# Patient Record
Sex: MALE | Race: BLACK OR AFRICAN AMERICAN | Employment: FULL TIME | URBAN - METROPOLITAN AREA
[De-identification: names, ages, dates, MRNs, and addresses within clinical notes are randomized per-mention and may not be internally consistent; named-entity substitution may affect disease eponyms.]

---

## 2020-01-16 NOTE — ROUTINE PROCESS
Javi Christensen has decided with their surgeon to have a joint replacement to improve mobility and decrease pain. Joint Replacement Pre-Operative class was attended 1/16/2020. Topics discussed included surgery preparation, what to expect the day of surgery, medications (to include a multimodal approach to pain control and limiting narcotics), nutrition, glycemic control, respiratory therapy, physical and occupational therapy, and discharge planning. Discussed the importance of using these alternative pain management methods with the goal of using less opioid use after surgery and at home. A patient education notebook was provided and the opportunity was given to ask questions. The phone number of the Orthopaedic  was provided for any future questions or concerns.

## 2020-01-17 ENCOUNTER — HOSPITAL ENCOUNTER (OUTPATIENT)
Dept: PREADMISSION TESTING | Age: 55
Discharge: HOME | End: 2020-01-17
Attending: ORTHOPAEDIC SURGERY
Payer: COMMERCIAL

## 2020-01-17 LAB
ABO + RH BLD: NORMAL
APPEARANCE UR: CLEAR
APTT PPP: 33.2 SEC (ref 23–36.4)
BILIRUB UR QL: NEGATIVE
BLOOD GROUP ANTIBODIES SERPL: NORMAL
COLOR UR: YELLOW
GLUCOSE UR STRIP.AUTO-MCNC: NEGATIVE MG/DL
HGB UR QL STRIP: NEGATIVE
INR PPP: 1 (ref 0.8–1.2)
KETONES UR QL STRIP.AUTO: NEGATIVE MG/DL
LEUKOCYTE ESTERASE UR QL STRIP.AUTO: NEGATIVE
NITRITE UR QL STRIP.AUTO: NEGATIVE
PH UR STRIP: 6.5 [PH] (ref 5–8)
PROT UR STRIP-MCNC: NEGATIVE MG/DL
PROTHROMBIN TIME: 12.9 SEC (ref 11.5–15.2)
SP GR UR REFRACTOMETRY: 1.02 (ref 1–1.03)
SPECIMEN EXP DATE BLD: NORMAL
UROBILINOGEN UR QL STRIP.AUTO: 0.2 EU/DL (ref 0.2–1)

## 2020-01-17 PROCEDURE — 81003 URINALYSIS AUTO W/O SCOPE: CPT

## 2020-01-17 PROCEDURE — 85730 THROMBOPLASTIN TIME PARTIAL: CPT

## 2020-01-17 PROCEDURE — 36415 COLL VENOUS BLD VENIPUNCTURE: CPT

## 2020-01-17 PROCEDURE — 86900 BLOOD TYPING SEROLOGIC ABO: CPT

## 2020-01-17 PROCEDURE — 87086 URINE CULTURE/COLONY COUNT: CPT

## 2020-01-17 PROCEDURE — 85610 PROTHROMBIN TIME: CPT

## 2020-01-19 LAB
BACTERIA SPEC CULT: NORMAL
SERVICE CMNT-IMP: NORMAL
SERVICE CMNT-IMP: NORMAL

## 2020-01-28 ENCOUNTER — ANESTHESIA EVENT (OUTPATIENT)
Dept: SURGERY | Age: 55
DRG: 470 | End: 2020-01-28
Payer: COMMERCIAL

## 2020-01-28 RX ORDER — TRANEXAMIC ACID 10 MG/ML
1 INJECTION, SOLUTION INTRAVENOUS
Status: CANCELLED | OUTPATIENT
Start: 2020-01-29 | End: 2020-01-29

## 2020-01-29 ENCOUNTER — ANESTHESIA (OUTPATIENT)
Dept: SURGERY | Age: 55
DRG: 470 | End: 2020-01-29
Payer: COMMERCIAL

## 2020-01-29 ENCOUNTER — HOSPITAL ENCOUNTER (INPATIENT)
Age: 55
LOS: 2 days | Discharge: HOME HEALTH | DRG: 470 | End: 2020-01-31
Attending: ORTHOPAEDIC SURGERY | Admitting: ORTHOPAEDIC SURGERY
Payer: COMMERCIAL

## 2020-01-29 ENCOUNTER — APPOINTMENT (OUTPATIENT)
Dept: GENERAL RADIOLOGY | Age: 55
DRG: 470 | End: 2020-01-29
Attending: ORTHOPAEDIC SURGERY
Payer: COMMERCIAL

## 2020-01-29 DIAGNOSIS — Z96.652 TOTAL KNEE REPLACEMENT STATUS, LEFT: Primary | ICD-10-CM

## 2020-01-29 LAB
GLUCOSE BLD STRIP.AUTO-MCNC: 104 MG/DL (ref 70–110)
GLUCOSE BLD STRIP.AUTO-MCNC: 193 MG/DL (ref 70–110)
GLUCOSE BLD STRIP.AUTO-MCNC: 241 MG/DL (ref 70–110)
GLUCOSE BLD STRIP.AUTO-MCNC: 264 MG/DL (ref 70–110)
GLUCOSE BLD STRIP.AUTO-MCNC: 93 MG/DL (ref 70–110)

## 2020-01-29 PROCEDURE — 77030020782 HC GWN BAIR PAWS FLX 3M -B: Performed by: ORTHOPAEDIC SURGERY

## 2020-01-29 PROCEDURE — 77030040815: Performed by: ORTHOPAEDIC SURGERY

## 2020-01-29 PROCEDURE — 74011250636 HC RX REV CODE- 250/636: Performed by: ANESTHESIOLOGY

## 2020-01-29 PROCEDURE — 77030018842 HC SOL IRR SOD CL 9% BAXT -A: Performed by: ORTHOPAEDIC SURGERY

## 2020-01-29 PROCEDURE — 77030002933 HC SUT MCRYL J&J -A: Performed by: ORTHOPAEDIC SURGERY

## 2020-01-29 PROCEDURE — 74011000250 HC RX REV CODE- 250: Performed by: ANESTHESIOLOGY

## 2020-01-29 PROCEDURE — 77030011628: Performed by: ORTHOPAEDIC SURGERY

## 2020-01-29 PROCEDURE — 77010033678 HC OXYGEN DAILY

## 2020-01-29 PROCEDURE — 82962 GLUCOSE BLOOD TEST: CPT

## 2020-01-29 PROCEDURE — 76942 ECHO GUIDE FOR BIOPSY: CPT | Performed by: ORTHOPAEDIC SURGERY

## 2020-01-29 PROCEDURE — 77030031139 HC SUT VCRL2 J&J -A: Performed by: ORTHOPAEDIC SURGERY

## 2020-01-29 PROCEDURE — 97161 PT EVAL LOW COMPLEX 20 MIN: CPT

## 2020-01-29 PROCEDURE — 77030000032 HC CUF TRNQT ZIMM -B: Performed by: ORTHOPAEDIC SURGERY

## 2020-01-29 PROCEDURE — 74011000258 HC RX REV CODE- 258: Performed by: ORTHOPAEDIC SURGERY

## 2020-01-29 PROCEDURE — C9290 INJ, BUPIVACAINE LIPOSOME: HCPCS | Performed by: ORTHOPAEDIC SURGERY

## 2020-01-29 PROCEDURE — 74011000258 HC RX REV CODE- 258: Performed by: NURSE ANESTHETIST, CERTIFIED REGISTERED

## 2020-01-29 PROCEDURE — C1776 JOINT DEVICE (IMPLANTABLE): HCPCS | Performed by: ORTHOPAEDIC SURGERY

## 2020-01-29 PROCEDURE — 77030016060 HC NDL NRV BLK TELE -A: Performed by: ANESTHESIOLOGY

## 2020-01-29 PROCEDURE — 77030033067 HC SUT PDO STRATFX SPIR J&J -B: Performed by: ORTHOPAEDIC SURGERY

## 2020-01-29 PROCEDURE — 74011250636 HC RX REV CODE- 250/636: Performed by: ORTHOPAEDIC SURGERY

## 2020-01-29 PROCEDURE — 64447 NJX AA&/STRD FEMORAL NRV IMG: CPT | Performed by: ANESTHESIOLOGY

## 2020-01-29 PROCEDURE — 73560 X-RAY EXAM OF KNEE 1 OR 2: CPT

## 2020-01-29 PROCEDURE — 77030013708 HC HNDPC SUC IRR PULS STRY –B: Performed by: ORTHOPAEDIC SURGERY

## 2020-01-29 PROCEDURE — 0SRD0J9 REPLACEMENT OF LEFT KNEE JOINT WITH SYNTHETIC SUBSTITUTE, CEMENTED, OPEN APPROACH: ICD-10-PCS | Performed by: ORTHOPAEDIC SURGERY

## 2020-01-29 PROCEDURE — 77030038010: Performed by: ORTHOPAEDIC SURGERY

## 2020-01-29 PROCEDURE — 77030027138 HC INCENT SPIROMETER -A: Performed by: ORTHOPAEDIC SURGERY

## 2020-01-29 PROCEDURE — 74011000250 HC RX REV CODE- 250: Performed by: NURSE ANESTHETIST, CERTIFIED REGISTERED

## 2020-01-29 PROCEDURE — 77030012551: Performed by: ORTHOPAEDIC SURGERY

## 2020-01-29 PROCEDURE — 76210000016 HC OR PH I REC 1 TO 1.5 HR: Performed by: ORTHOPAEDIC SURGERY

## 2020-01-29 PROCEDURE — 74011636637 HC RX REV CODE- 636/637: Performed by: ORTHOPAEDIC SURGERY

## 2020-01-29 PROCEDURE — 97116 GAIT TRAINING THERAPY: CPT

## 2020-01-29 PROCEDURE — 77030040361 HC SLV COMPR DVT MDII -B: Performed by: ORTHOPAEDIC SURGERY

## 2020-01-29 PROCEDURE — 74011000250 HC RX REV CODE- 250: Performed by: ORTHOPAEDIC SURGERY

## 2020-01-29 PROCEDURE — 74011250637 HC RX REV CODE- 250/637: Performed by: ANESTHESIOLOGY

## 2020-01-29 PROCEDURE — C1713 ANCHOR/SCREW BN/BN,TIS/BN: HCPCS | Performed by: ORTHOPAEDIC SURGERY

## 2020-01-29 PROCEDURE — 77030034479 HC ADH SKN CLSR PRINEO J&J -B: Performed by: ORTHOPAEDIC SURGERY

## 2020-01-29 PROCEDURE — 74011250637 HC RX REV CODE- 250/637: Performed by: ORTHOPAEDIC SURGERY

## 2020-01-29 PROCEDURE — 76010000131 HC OR TIME 2 TO 2.5 HR: Performed by: ORTHOPAEDIC SURGERY

## 2020-01-29 PROCEDURE — 74011250636 HC RX REV CODE- 250/636: Performed by: NURSE ANESTHETIST, CERTIFIED REGISTERED

## 2020-01-29 PROCEDURE — 65270000029 HC RM PRIVATE

## 2020-01-29 PROCEDURE — 76060000035 HC ANESTHESIA 2 TO 2.5 HR: Performed by: ORTHOPAEDIC SURGERY

## 2020-01-29 PROCEDURE — 77030037875 HC DRSG MEPILEX <16IN BORD MOLN -A: Performed by: ORTHOPAEDIC SURGERY

## 2020-01-29 DEVICE — COMPONENT FEM PAT PFJ L W OXINIUM AND RND: Type: IMPLANTABLE DEVICE | Site: KNEE | Status: FUNCTIONAL

## 2020-01-29 DEVICE — CEMENT BNE 40GM FULL DOSE PMMA W/ GENT HI VISC RADPQ LNG: Type: IMPLANTABLE DEVICE | Site: KNEE | Status: FUNCTIONAL

## 2020-01-29 DEVICE — CEMENT BNE 40GM FULL DOSE PMMA W/O ANTIBIO HI VISC N RADPQ: Type: IMPLANTABLE DEVICE | Site: KNEE | Status: FUNCTIONAL

## 2020-01-29 DEVICE — JOURNEY II CR INSERT XLPE LEFT SIZE 3-4 9MM
Type: IMPLANTABLE DEVICE | Site: KNEE | Status: FUNCTIONAL
Brand: JOURNEY

## 2020-01-29 DEVICE — JOURNEY II CR FEMORAL OXINIUM NONPOROUS LEFT SIZE 4
Type: IMPLANTABLE DEVICE | Site: KNEE | Status: FUNCTIONAL
Brand: JOURNEY

## 2020-01-29 DEVICE — JOURNEY TIBIAL BASEPLATE NONPOROUS                                    LEFT SIZE 4
Type: IMPLANTABLE DEVICE | Site: KNEE | Status: FUNCTIONAL
Brand: JOURNEY

## 2020-01-29 RX ORDER — DIPHENHYDRAMINE HCL 25 MG
25 CAPSULE ORAL
Status: DISCONTINUED | OUTPATIENT
Start: 2020-01-29 | End: 2020-01-31 | Stop reason: HOSPADM

## 2020-01-29 RX ORDER — HYDROMORPHONE HYDROCHLORIDE 1 MG/ML
1 INJECTION, SOLUTION INTRAMUSCULAR; INTRAVENOUS; SUBCUTANEOUS
Status: DISCONTINUED | OUTPATIENT
Start: 2020-01-29 | End: 2020-01-31 | Stop reason: HOSPADM

## 2020-01-29 RX ORDER — ACETAMINOPHEN 500 MG
1000 TABLET ORAL EVERY 8 HOURS
Status: DISCONTINUED | OUTPATIENT
Start: 2020-01-29 | End: 2020-01-31 | Stop reason: HOSPADM

## 2020-01-29 RX ORDER — PROPOFOL 10 MG/ML
INJECTION, EMULSION INTRAVENOUS AS NEEDED
Status: DISCONTINUED | OUTPATIENT
Start: 2020-01-29 | End: 2020-01-29 | Stop reason: HOSPADM

## 2020-01-29 RX ORDER — INSULIN LISPRO 100 [IU]/ML
INJECTION, SOLUTION INTRAVENOUS; SUBCUTANEOUS
Status: DISCONTINUED | OUTPATIENT
Start: 2020-01-29 | End: 2020-01-31 | Stop reason: HOSPADM

## 2020-01-29 RX ORDER — ROSUVASTATIN CALCIUM 10 MG/1
40 TABLET, COATED ORAL
Status: DISCONTINUED | OUTPATIENT
Start: 2020-01-29 | End: 2020-01-31 | Stop reason: HOSPADM

## 2020-01-29 RX ORDER — INSULIN LISPRO 100 [IU]/ML
INJECTION, SOLUTION INTRAVENOUS; SUBCUTANEOUS ONCE
Status: ACTIVE | OUTPATIENT
Start: 2020-01-29 | End: 2020-01-29

## 2020-01-29 RX ORDER — PREGABALIN 50 MG/1
50 CAPSULE ORAL
Status: COMPLETED | OUTPATIENT
Start: 2020-01-29 | End: 2020-01-29

## 2020-01-29 RX ORDER — SODIUM CHLORIDE, SODIUM LACTATE, POTASSIUM CHLORIDE, CALCIUM CHLORIDE 600; 310; 30; 20 MG/100ML; MG/100ML; MG/100ML; MG/100ML
125 INJECTION, SOLUTION INTRAVENOUS CONTINUOUS
Status: DISCONTINUED | OUTPATIENT
Start: 2020-01-29 | End: 2020-01-31 | Stop reason: HOSPADM

## 2020-01-29 RX ORDER — BUPIVACAINE HYDROCHLORIDE 2.5 MG/ML
INJECTION, SOLUTION EPIDURAL; INFILTRATION; INTRACAUDAL AS NEEDED
Status: DISCONTINUED | OUTPATIENT
Start: 2020-01-29 | End: 2020-01-29 | Stop reason: HOSPADM

## 2020-01-29 RX ORDER — SODIUM CHLORIDE 0.9 % (FLUSH) 0.9 %
5-40 SYRINGE (ML) INJECTION AS NEEDED
Status: DISCONTINUED | OUTPATIENT
Start: 2020-01-29 | End: 2020-01-31 | Stop reason: HOSPADM

## 2020-01-29 RX ORDER — LORATADINE 10 MG/1
10 TABLET ORAL DAILY
Status: DISCONTINUED | OUTPATIENT
Start: 2020-01-30 | End: 2020-01-31 | Stop reason: HOSPADM

## 2020-01-29 RX ORDER — ONDANSETRON 2 MG/ML
4 INJECTION INTRAMUSCULAR; INTRAVENOUS
Status: DISCONTINUED | OUTPATIENT
Start: 2020-01-29 | End: 2020-01-31 | Stop reason: HOSPADM

## 2020-01-29 RX ORDER — INSULIN LISPRO 100 [IU]/ML
INJECTION, SOLUTION INTRAVENOUS; SUBCUTANEOUS ONCE
Status: DISCONTINUED | OUTPATIENT
Start: 2020-01-29 | End: 2020-01-29 | Stop reason: HOSPADM

## 2020-01-29 RX ORDER — CEFAZOLIN SODIUM 2 G/50ML
2 SOLUTION INTRAVENOUS EVERY 8 HOURS
Status: COMPLETED | OUTPATIENT
Start: 2020-01-29 | End: 2020-01-31

## 2020-01-29 RX ORDER — SODIUM CHLORIDE, SODIUM LACTATE, POTASSIUM CHLORIDE, CALCIUM CHLORIDE 600; 310; 30; 20 MG/100ML; MG/100ML; MG/100ML; MG/100ML
125 INJECTION, SOLUTION INTRAVENOUS CONTINUOUS
Status: DISCONTINUED | OUTPATIENT
Start: 2020-01-29 | End: 2020-01-29 | Stop reason: HOSPADM

## 2020-01-29 RX ORDER — KETOROLAC TROMETHAMINE 30 MG/ML
15 INJECTION, SOLUTION INTRAMUSCULAR; INTRAVENOUS EVERY 6 HOURS
Status: COMPLETED | OUTPATIENT
Start: 2020-01-29 | End: 2020-01-30

## 2020-01-29 RX ORDER — OXYCODONE HYDROCHLORIDE 5 MG/1
10 TABLET ORAL
Status: DISCONTINUED | OUTPATIENT
Start: 2020-01-29 | End: 2020-01-31 | Stop reason: HOSPADM

## 2020-01-29 RX ORDER — SODIUM CHLORIDE 9 MG/ML
125 INJECTION, SOLUTION INTRAVENOUS CONTINUOUS
Status: DISPENSED | OUTPATIENT
Start: 2020-01-29 | End: 2020-01-30

## 2020-01-29 RX ORDER — DEXAMETHASONE SODIUM PHOSPHATE 4 MG/ML
INJECTION, SOLUTION INTRA-ARTICULAR; INTRALESIONAL; INTRAMUSCULAR; INTRAVENOUS; SOFT TISSUE AS NEEDED
Status: DISCONTINUED | OUTPATIENT
Start: 2020-01-29 | End: 2020-01-29 | Stop reason: HOSPADM

## 2020-01-29 RX ORDER — SODIUM CHLORIDE 0.9 % (FLUSH) 0.9 %
5-40 SYRINGE (ML) INJECTION EVERY 8 HOURS
Status: DISCONTINUED | OUTPATIENT
Start: 2020-01-29 | End: 2020-01-31 | Stop reason: HOSPADM

## 2020-01-29 RX ORDER — ASPIRIN 81 MG/1
81 TABLET ORAL 2 TIMES DAILY
Status: DISCONTINUED | OUTPATIENT
Start: 2020-01-29 | End: 2020-01-31 | Stop reason: HOSPADM

## 2020-01-29 RX ORDER — FENTANYL CITRATE 50 UG/ML
25 INJECTION, SOLUTION INTRAMUSCULAR; INTRAVENOUS AS NEEDED
Status: DISCONTINUED | OUTPATIENT
Start: 2020-01-29 | End: 2020-01-29 | Stop reason: HOSPADM

## 2020-01-29 RX ORDER — SODIUM CHLORIDE 0.9 % (FLUSH) 0.9 %
5-40 SYRINGE (ML) INJECTION EVERY 8 HOURS
Status: DISCONTINUED | OUTPATIENT
Start: 2020-01-29 | End: 2020-01-29 | Stop reason: HOSPADM

## 2020-01-29 RX ORDER — OLMESARTAN MEDOXOMIL 20 MG/1
20 TABLET ORAL DAILY
Status: DISCONTINUED | OUTPATIENT
Start: 2020-01-30 | End: 2020-01-31 | Stop reason: HOSPADM

## 2020-01-29 RX ORDER — MIDAZOLAM HYDROCHLORIDE 1 MG/ML
INJECTION, SOLUTION INTRAMUSCULAR; INTRAVENOUS AS NEEDED
Status: DISCONTINUED | OUTPATIENT
Start: 2020-01-29 | End: 2020-01-29 | Stop reason: HOSPADM

## 2020-01-29 RX ORDER — HYDROMORPHONE HYDROCHLORIDE 2 MG/ML
0.5 INJECTION, SOLUTION INTRAMUSCULAR; INTRAVENOUS; SUBCUTANEOUS
Status: DISCONTINUED | OUTPATIENT
Start: 2020-01-29 | End: 2020-01-29 | Stop reason: HOSPADM

## 2020-01-29 RX ORDER — DEXTROSE MONOHYDRATE 100 MG/ML
125-250 INJECTION, SOLUTION INTRAVENOUS AS NEEDED
Status: DISCONTINUED | OUTPATIENT
Start: 2020-01-29 | End: 2020-01-31 | Stop reason: HOSPADM

## 2020-01-29 RX ORDER — ACETAMINOPHEN 500 MG
1000 TABLET ORAL
Status: COMPLETED | OUTPATIENT
Start: 2020-01-29 | End: 2020-01-29

## 2020-01-29 RX ORDER — LIDOCAINE HYDROCHLORIDE 20 MG/ML
INJECTION, SOLUTION EPIDURAL; INFILTRATION; INTRACAUDAL; PERINEURAL AS NEEDED
Status: DISCONTINUED | OUTPATIENT
Start: 2020-01-29 | End: 2020-01-29 | Stop reason: HOSPADM

## 2020-01-29 RX ORDER — HYDROCHLOROTHIAZIDE 12.5 MG/1
12.5 CAPSULE ORAL DAILY
Status: DISCONTINUED | OUTPATIENT
Start: 2020-01-30 | End: 2020-01-31 | Stop reason: HOSPADM

## 2020-01-29 RX ORDER — LANOLIN ALCOHOL/MO/W.PET/CERES
1 CREAM (GRAM) TOPICAL
Status: DISCONTINUED | OUTPATIENT
Start: 2020-01-30 | End: 2020-01-31 | Stop reason: HOSPADM

## 2020-01-29 RX ORDER — CEFAZOLIN SODIUM 2 G/50ML
2 SOLUTION INTRAVENOUS ONCE
Status: COMPLETED | OUTPATIENT
Start: 2020-01-29 | End: 2020-01-29

## 2020-01-29 RX ORDER — ONDANSETRON 2 MG/ML
INJECTION INTRAMUSCULAR; INTRAVENOUS AS NEEDED
Status: DISCONTINUED | OUTPATIENT
Start: 2020-01-29 | End: 2020-01-29 | Stop reason: HOSPADM

## 2020-01-29 RX ORDER — MAGNESIUM SULFATE 100 %
4 CRYSTALS MISCELLANEOUS AS NEEDED
Status: DISCONTINUED | OUTPATIENT
Start: 2020-01-29 | End: 2020-01-31 | Stop reason: HOSPADM

## 2020-01-29 RX ORDER — OXYCODONE HYDROCHLORIDE 5 MG/1
5 TABLET ORAL
Status: DISCONTINUED | OUTPATIENT
Start: 2020-01-29 | End: 2020-01-31 | Stop reason: HOSPADM

## 2020-01-29 RX ORDER — NALOXONE HYDROCHLORIDE 0.4 MG/ML
0.4 INJECTION, SOLUTION INTRAMUSCULAR; INTRAVENOUS; SUBCUTANEOUS AS NEEDED
Status: DISCONTINUED | OUTPATIENT
Start: 2020-01-29 | End: 2020-01-31 | Stop reason: HOSPADM

## 2020-01-29 RX ORDER — LORAZEPAM 0.5 MG/1
0.5 TABLET ORAL
Status: DISCONTINUED | OUTPATIENT
Start: 2020-01-29 | End: 2020-01-31 | Stop reason: HOSPADM

## 2020-01-29 RX ORDER — CELECOXIB 100 MG/1
200 CAPSULE ORAL
Status: COMPLETED | OUTPATIENT
Start: 2020-01-29 | End: 2020-01-29

## 2020-01-29 RX ORDER — MAGNESIUM SULFATE 100 %
4 CRYSTALS MISCELLANEOUS AS NEEDED
Status: DISCONTINUED | OUTPATIENT
Start: 2020-01-29 | End: 2020-01-29 | Stop reason: HOSPADM

## 2020-01-29 RX ORDER — HYDROMORPHONE HYDROCHLORIDE 2 MG/ML
INJECTION, SOLUTION INTRAMUSCULAR; INTRAVENOUS; SUBCUTANEOUS AS NEEDED
Status: DISCONTINUED | OUTPATIENT
Start: 2020-01-29 | End: 2020-01-29 | Stop reason: HOSPADM

## 2020-01-29 RX ORDER — KETAMINE HYDROCHLORIDE 10 MG/ML
INJECTION, SOLUTION INTRAMUSCULAR; INTRAVENOUS AS NEEDED
Status: DISCONTINUED | OUTPATIENT
Start: 2020-01-29 | End: 2020-01-29 | Stop reason: HOSPADM

## 2020-01-29 RX ORDER — SODIUM CHLORIDE 0.9 % (FLUSH) 0.9 %
5-40 SYRINGE (ML) INJECTION AS NEEDED
Status: DISCONTINUED | OUTPATIENT
Start: 2020-01-29 | End: 2020-01-29 | Stop reason: HOSPADM

## 2020-01-29 RX ORDER — GLYBURIDE 5 MG/1
5 TABLET ORAL
Status: DISCONTINUED | OUTPATIENT
Start: 2020-01-30 | End: 2020-01-30 | Stop reason: ALTCHOICE

## 2020-01-29 RX ORDER — EZETIMIBE 10 MG/1
10 TABLET ORAL
Status: DISCONTINUED | OUTPATIENT
Start: 2020-01-29 | End: 2020-01-31 | Stop reason: HOSPADM

## 2020-01-29 RX ORDER — AMOXICILLIN 250 MG
1 CAPSULE ORAL 2 TIMES DAILY
Status: DISCONTINUED | OUTPATIENT
Start: 2020-01-29 | End: 2020-01-31 | Stop reason: HOSPADM

## 2020-01-29 RX ORDER — INSULIN LISPRO 100 [IU]/ML
INJECTION, SOLUTION INTRAVENOUS; SUBCUTANEOUS
Status: DISCONTINUED | OUTPATIENT
Start: 2020-01-29 | End: 2020-01-29

## 2020-01-29 RX ADMIN — OXYCODONE 10 MG: 5 TABLET ORAL at 18:45

## 2020-01-29 RX ADMIN — OXYCODONE 5 MG: 5 TABLET ORAL at 14:20

## 2020-01-29 RX ADMIN — ACETAMINOPHEN 1000 MG: 500 TABLET ORAL at 15:43

## 2020-01-29 RX ADMIN — LIDOCAINE HYDROCHLORIDE 100 MG: 20 INJECTION, SOLUTION EPIDURAL; INFILTRATION; INTRACAUDAL; PERINEURAL at 09:29

## 2020-01-29 RX ADMIN — ROPIVACAINE HYDROCHLORIDE 25 ML: 5 INJECTION, SOLUTION EPIDURAL; INFILTRATION; PERINEURAL at 08:56

## 2020-01-29 RX ADMIN — PROPOFOL 200 MG: 10 INJECTION, EMULSION INTRAVENOUS at 09:29

## 2020-01-29 RX ADMIN — KETAMINE HYDROCHLORIDE 20 MG: 10 INJECTION, SOLUTION INTRAMUSCULAR; INTRAVENOUS at 08:51

## 2020-01-29 RX ADMIN — ACETAMINOPHEN 1000 MG: 500 TABLET ORAL at 07:54

## 2020-01-29 RX ADMIN — Medication 81 MG: at 20:35

## 2020-01-29 RX ADMIN — EZETIMIBE 10 MG: 10 TABLET ORAL at 22:51

## 2020-01-29 RX ADMIN — SODIUM CHLORIDE, SODIUM LACTATE, POTASSIUM CHLORIDE, AND CALCIUM CHLORIDE 125 ML/HR: 600; 310; 30; 20 INJECTION, SOLUTION INTRAVENOUS at 12:08

## 2020-01-29 RX ADMIN — DEXAMETHASONE SODIUM PHOSPHATE 4 MG: 4 INJECTION, SOLUTION INTRAMUSCULAR; INTRAVENOUS at 09:34

## 2020-01-29 RX ADMIN — KETAMINE HYDROCHLORIDE 20 MG: 10 INJECTION, SOLUTION INTRAMUSCULAR; INTRAVENOUS at 09:29

## 2020-01-29 RX ADMIN — PREGABALIN 50 MG: 50 CAPSULE ORAL at 07:54

## 2020-01-29 RX ADMIN — HYDROMORPHONE HYDROCHLORIDE 1 MG: 2 INJECTION, SOLUTION INTRAMUSCULAR; INTRAVENOUS; SUBCUTANEOUS at 09:34

## 2020-01-29 RX ADMIN — ONDANSETRON HYDROCHLORIDE 4 MG: 2 INJECTION INTRAMUSCULAR; INTRAVENOUS at 11:10

## 2020-01-29 RX ADMIN — CEFAZOLIN SODIUM 2 G: 2 SOLUTION INTRAVENOUS at 09:33

## 2020-01-29 RX ADMIN — MIDAZOLAM 4 MG: 1 INJECTION INTRAMUSCULAR; INTRAVENOUS at 08:51

## 2020-01-29 RX ADMIN — Medication 81 MG: at 13:52

## 2020-01-29 RX ADMIN — SODIUM CHLORIDE, SODIUM LACTATE, POTASSIUM CHLORIDE, AND CALCIUM CHLORIDE 125 ML/HR: 600; 310; 30; 20 INJECTION, SOLUTION INTRAVENOUS at 07:46

## 2020-01-29 RX ADMIN — DEXMEDETOMIDINE HYDROCHLORIDE 8 MCG: 100 INJECTION, SOLUTION INTRAVENOUS at 09:44

## 2020-01-29 RX ADMIN — SENNOSIDES AND DOCUSATE SODIUM 1 TABLET: 8.6; 5 TABLET ORAL at 20:35

## 2020-01-29 RX ADMIN — DEXMEDETOMIDINE HYDROCHLORIDE 8 MCG: 100 INJECTION, SOLUTION INTRAVENOUS at 09:39

## 2020-01-29 RX ADMIN — INSULIN LISPRO 3 UNITS: 100 INJECTION, SOLUTION INTRAVENOUS; SUBCUTANEOUS at 14:20

## 2020-01-29 RX ADMIN — FENTANYL CITRATE 25 MCG: 50 INJECTION, SOLUTION INTRAMUSCULAR; INTRAVENOUS at 12:27

## 2020-01-29 RX ADMIN — KETAMINE HYDROCHLORIDE 10 MG: 10 INJECTION, SOLUTION INTRAMUSCULAR; INTRAVENOUS at 08:52

## 2020-01-29 RX ADMIN — INSULIN LISPRO 9 UNITS: 100 INJECTION, SOLUTION INTRAVENOUS; SUBCUTANEOUS at 22:51

## 2020-01-29 RX ADMIN — SENNOSIDES AND DOCUSATE SODIUM 1 TABLET: 8.6; 5 TABLET ORAL at 13:52

## 2020-01-29 RX ADMIN — TRANEXAMIC ACID 1 G: 100 INJECTION, SOLUTION INTRAVENOUS at 11:00

## 2020-01-29 RX ADMIN — PROPOFOL 50 MG: 10 INJECTION, EMULSION INTRAVENOUS at 09:30

## 2020-01-29 RX ADMIN — SODIUM CHLORIDE 125 ML/HR: 900 INJECTION, SOLUTION INTRAVENOUS at 13:50

## 2020-01-29 RX ADMIN — SODIUM CHLORIDE, SODIUM LACTATE, POTASSIUM CHLORIDE, AND CALCIUM CHLORIDE: 600; 310; 30; 20 INJECTION, SOLUTION INTRAVENOUS at 10:14

## 2020-01-29 RX ADMIN — CEFAZOLIN SODIUM 2 G: 2 SOLUTION INTRAVENOUS at 17:10

## 2020-01-29 RX ADMIN — ROSUVASTATIN CALCIUM 40 MG: 10 TABLET, COATED ORAL at 22:51

## 2020-01-29 RX ADMIN — TRANEXAMIC ACID 1 G: 100 INJECTION, SOLUTION INTRAVENOUS at 09:36

## 2020-01-29 RX ADMIN — KETOROLAC TROMETHAMINE 15 MG: 30 INJECTION, SOLUTION INTRAMUSCULAR at 13:52

## 2020-01-29 RX ADMIN — KETOROLAC TROMETHAMINE 15 MG: 30 INJECTION, SOLUTION INTRAMUSCULAR at 17:10

## 2020-01-29 RX ADMIN — CELECOXIB 200 MG: 100 CAPSULE ORAL at 07:54

## 2020-01-29 NOTE — INTERVAL H&P NOTE
H&P Update: 
Annamaria Enciso was seen and examined. History and physical has been reviewed. The patient has been examined.  There have been no significant clinical changes since the completion of the originally dated History and Physical.

## 2020-01-29 NOTE — ROUTINE PROCESS
1550 
 - Bedside and Verbal shift change report given to Ranjana RN by Kamryn Rivera RN. Report included the following information SBAR, Kardex, OR Summary, Intake/Output and MAR.

## 2020-01-29 NOTE — PERIOP NOTES
TRANSFER - OUT REPORT:    Verbal report given to Jana(name) on Annamaria Black  being transferred to  212(unit) for routine post - op       Report consisted of patients Situation, Background, Assessment and   Recommendations(SBAR). Information from the following report(s) SBAR, Procedure Summary, Intake/Output, MAR and Recent Results was reviewed with the receiving nurse. Lines:   Peripheral IV 01/29/20 Left;Posterior Hand (Active)   Site Assessment Clean, dry, & intact 1/29/2020 12:12 PM   Phlebitis Assessment 0 1/29/2020 12:12 PM   Infiltration Assessment 0 1/29/2020 12:12 PM   Dressing Status Clean, dry, & intact 1/29/2020 12:12 PM   Dressing Type Tape;Transparent 1/29/2020 12:12 PM   Hub Color/Line Status Green; Infusing 1/29/2020 12:12 PM        Opportunity for questions and clarification was provided.       Patient transported with:   O2 @ 2 liters  Registered Nurse

## 2020-01-29 NOTE — DIABETES MGMT
Diabetes Patient/Family Education Record  Factors That  May Influence Patients Ability  to Learn or  Comply with Recommendations   []   Language barrier    []   Cultural needs   []   Motivation    []   Cognitive limitation    []   Physical   []   Education    []   Physiological factors   []   Hearing/vision/speaking impairment   []   Baptist beliefs    []   Financial factors   []  Other:   [x]  No factors identified at this time.      Person Instructed:   [x]   Patient   []   Family   [x]  Other     Preference for Learning:   [x]   Verbal   []   Written   []  Demonstration     Level of Comprehension & Competence:    [x]  Good                                      [] Fair                                     []  Poor                             []  Needs Reinforcement   [x]  Teachback completed    Education Component:   [x]  Medication management, including how to administer insulin; confirmed home regimen   []  Nutritional management -obtain usual meal pattern   []  Exercise   []  Signs, symptoms, and treatment of hyperglycemia and hypoglycemia   [] Prevention, recognition and treatment of hyperglycemia and hypoglycemia   [x]  Importance of blood glucose monitoring     []  Instruction on use of the blood glucose meter   [x]  Discuss the importance of HbA1C monitoring    []  Sick day guidelines   []  Proper use and disposal of lancets, needles, syringes or insulin pens (if appropriate)   []  Potential long-term complications (retinopathy, kidney disease, neuropathy, foot care)   [] Information about whom to contact in case of emergency or for more information    [x]  Goal:  Patient/family will demonstrate understanding of Diabetes Self Management Skills by: (date) _3/31______  Plan for post-discharge education or self-management support:    [] Outpatient class schedule provided            [] Patient Declined    [] Scheduled for outpatient classes (date) _______  Verify:  Does patient understand how diabetes medications work? ____________________________  Does patient know what their most recent A1c is? ________________yes___________________  Does patient monitor glucose at home? _________________yes__________________________  Does patient have difficulty obtaining diabetes medications or testing supplies? _________________         Wendie Chandra MS, RN, CDE  Glycemic Control Team  [phone]  Pager [phone] (- 8:00-4:30P)  *After Hours pager [phone]

## 2020-01-29 NOTE — ANESTHESIA PROCEDURE NOTES
Peripheral Block    Start time: 1/29/2020 8:51 AM  End time: 1/29/2020 8:56 AM  Performed by: Lamin Brooke MD  Authorized by: Lamin Brooke MD       Pre-procedure: Indications: at surgeon's request and post-op pain management    Preanesthetic Checklist: patient identified, risks and benefits discussed, site marked, timeout performed, anesthesia consent given and patient being monitored    Timeout Time: 08:51          Block Type:   Block Type: Adductor canal  Laterality:  Left  Monitoring:  Standard ASA monitoring, continuous pulse ox, frequent vital sign checks, heart rate, oxygen and responsive to questions  Injection Technique:  Single shot  Procedures: ultrasound guided    Patient Position: supine  Prep: chlorhexidine    Location:  Mid thigh  Needle Type:  Stimuplex  Needle Gauge:  19 G  Needle Localization:  Ultrasound guidance    Assessment:  Number of attempts:  1  Injection Assessment:  Incremental injection every 5 mL, negative aspiration for CSF, no paresthesia, ultrasound image on chart, local visualized surrounding nerve on ultrasound, negative aspiration for blood and no intravascular symptoms  Patient tolerance:  Patient tolerated the procedure well with no immediate complications  Patient able to straight leg raise  left leg after block. No sensory or motor block.

## 2020-01-29 NOTE — DIABETES MGMT
GLYCEMIC CONTROL PROGRESS NOTE:    - known h/o T2DM, HbA1C ordered per protocol, BID mixed insulin/oral home regimen  - Decadron 4 mg x1, steroid-induced hyperglycemia  - BG out of target range non-ICU: < 180 mg/dL  - do not resume home regimen, both glyburide and mixed insulin (non-formulary) have increased risk of IP hypoglycemia  - recommend weight based basal/mealtime inpatient regimen   *Lantus 20 units daily give now   *Humalog 8 units qac  - please consult GC for post operative blood glucose management  Recent Glucose Results:   Lab Results   Component Value Date/Time    GLUCPOC 193 (H) 01/29/2020 02:05 PM    GLUCPOC 104 01/29/2020 11:32 AM    GLUCPOC 93 01/29/2020 07:36 AM     Jean-Claude Bishop MS, RN, CDE  Glycemic Control Team  [phone]  Pager [phone] (M-TH 8:00-4:30P)  *After Hours pager [phone]

## 2020-01-29 NOTE — PROGRESS NOTES
Problem: Mobility Impaired (Adult and Pediatric)  Goal: *Acute Goals and Plan of Care (Insert Text)  Description  In 1-7 days pt will be able to perform:  ST.  Bed mobility:  Rolling L to R to L modified independent for positioning. 2.  Supine to sit to supine S with HR for meals. 3.  Sit to stand to sit S with RW in prep for ambulation. LT.  Gait:  Ambulate >150ft S with RW, WBAT, for home/community mobility. 2.  Stair Negotiation:  Ascend/descend >4 steps CGA with HR for home entry. 3.  Activity tolerance: Tolerate up in chair 1-2 hours for ADLs. 4.  Patient/Family Education:  Patient/family to be independent with HEP for follow-up care and safe discharge. Note:   PHYSICAL THERAPY EVALUATION    Patient: Asa Major (53 y.o. male)  Date: 2020  Primary Diagnosis: Total knee replacement status, left [Z96.652]  Procedure(s) (LRB):  LEFT TOTAL KNEE ARTHROPLASTY AND ALL INDICATED PROCEDURES (Left) Day of Surgery   Precautions:   Fall, WBAT    ASSESSMENT :  Based on the objective data described below, the patient presents with decreased functional mobility and independence in regard to bed mobility, transfers, gt quality and tolerance, L knee AROM, L knee strength, pain, balance, activity tolerance, stair negotiation and safety due to recent L TKA surgery. Pt rating pain on numerical pain scale 4/10. Pt required CGA for supine>sit and CGA for sit<>stand. Pt required vc for safe techniques. Pt able to participate in gt training w/ RW, WBAT, GB and CGA in hallway w/ antalgic pattern. Pt returned to sitting EOB w/ all needs within reach and ice pack to L knee. Nurse [place] aware and father present. Recommend [facility] upon hospital d/c. Patient will benefit from skilled intervention to address the above impairments.   Patients rehabilitation potential is considered to be Good  Factors which may influence rehabilitation potential include:   []         None noted  []         Mental ability/status  []         Medical condition  []         Home/family situation and support systems  []         Safety awareness  [x]         Pain tolerance/management  []         Other:      PLAN :  Recommendations and Planned Interventions:  [x]           Bed Mobility Training             []    Neuromuscular Re-Education  [x]           Transfer Training                   []    Orthotic/Prosthetic Training  [x]           Gait Training                          []    Modalities  [x]           Therapeutic Exercises          []    Edema Management/Control  [x]           Therapeutic Activities            [x]    Patient and Family Training/Education  []           Other (comment):    Frequency/Duration: Patient will be followed by physical therapy twice daily to address goals. Discharge Recommendations: Home Health  Further Equipment Recommendations for Discharge: N/A     SUBJECTIVE:   Patient stated I was going to take a nap.     OBJECTIVE DATA SUMMARY:     Past Medical History:   Diagnosis Date    Arthritis     osteo- knees    Diabetes ([facility] [address] 2011    Hypercholesteremia     Hypertension 2011    Ill-defined condition     hypercholesterolemia     Past Surgical History:   Procedure Laterality Date    HX HEENT  2013    deviated septum repair    HX KNEE ARTHROSCOPY Left     X 2     Barriers to Learning/Limitations: None  Compensate with: visual, verbal, tactile, kinesthetic cues/model  Prior Level of Function/Home Situation:   Home Situation  Home Environment: Private residence  # Steps to Enter: 5  Rails to Enter: Yes  Hand Rails : Bilateral  One/Two Story Residence: Two story  # of Interior Steps: 15  Interior Rails: Left  Lift Chair Available: No  Living Alone: No  Support Systems: Spouse/Significant Other/Partner  Patient Expects to be Discharged to[de-identified] Private residence  Current DME Used/Available at Home: Walker, rolling  Critical Behavior:  Neurologic State: Alert; Appropriate for age  Orientation Level: Oriented X4  Cognition: Appropriate decision making; Appropriate for age attention/concentration; Appropriate safety awareness; Follows commands  Safety/Judgement: Awareness of environment  Psychosocial  Patient Behaviors: Cooperative;Calm  Purposeful Interaction: Yes  Pt Identified Daily Priority: Clinical issues (comment)  Caritas Process: Nurture loving kindness;Establish trust;Teaching/learning; Attend basic human needs;Create healing environment  Caring Interventions: Reassure  Reassure: Therapeutic listening; Acceptance; Informing;Support family; Instilling guillermina and hope  Skin Condition/Temp: Dry;Warm  Skin Integrity: Incision (comment)(L knee)  Skin Integumentary  Skin Color: Appropriate for ethnicity  Skin Condition/Temp: Dry;Warm  Skin Integrity: Incision (comment)(L knee)  Turgor: Non-tenting  Hair Growth: Present  Varicosities: Absent  Strength:    Strength: Generally decreased, functional  Tone & Sensation:   Tone: Normal  Sensation: Intact  Range Of Motion:  AROM: Generally decreased, functional  Functional Mobility:  Bed Mobility:  Supine to Sit: Contact guard assistance(vc)  Scooting: Contact guard assistance(vc)  Transfers:  Sit to Stand: Contact guard assistance(vc)  Stand to Sit: Contact guard assistance(vc)  Balance:   Sitting: Intact  Standing: Intact; With support  Ambulation/Gait Training:  Distance (ft): 68 Feet (ft)  Assistive Device: Walker, rolling;Gait belt  Ambulation - Level of Assistance: Contact guard assistance(vc)  Gait Abnormalities: Antalgic;Decreased step clearance; Step to gait  Left Side Weight Bearing: As tolerated  Base of Support: Shift to right  Stance: Left decreased  Speed/Marielos: Slow  Step Length: Left shortened;Right shortened  Swing Pattern: Left asymmetrical;Right asymmetrical  Interventions: Safety awareness training; Tactile cues; Verbal cues; Visual/Demos  Therapeutic Exercises:   Encouraged ankle pumps and knee flexion.   Pain:  Pain Scale 1: Numeric (0 - 10)  Pain Intensity 1: 4  Pain Location 1: Knee  Pain Orientation 1: Left  Pain Description 1: Aching  Pain Intervention(s) 1: Rest;Repositioned  Activity Tolerance:   Fair   Please refer to the flowsheet for vital signs taken during this treatment. After treatment:   [x]         Patient left in no apparent distress sitting up EOB  []         Patient left in no apparent distress in bed  [x]         Call bell left within reach  [x]         Nursing notified  []         Caregiver present  []         Bed alarm activated    COMMUNICATION/EDUCATION:   [x]         Fall prevention education was provided and the patient/caregiver indicated understanding. [x]         Patient/family have participated as able in goal setting and plan of care. [x]         Patient/family agree to work toward stated goals and plan of care. []         Patient understands intent and goals of therapy, but is neutral about his/her participation. []         Patient is unable to participate in goal setting and plan of care.     Thank you for this referral.  America Smith, PT   Time Calculation: 27 mins       Eval Complexity: History: HIGH Complexity :3+ comorbidities / personal factors will impact the outcome/ POC Exam:MEDIUM Complexity : 3 Standardized tests and measures addressing body structure, function, activity limitation and / or participation in recreation  Presentation: LOW Complexity : Stable, uncomplicated  Clinical Decision Making:Low Complexity    Overall Complexity:LOW

## 2020-01-29 NOTE — PERIOP NOTES
Patria at [facility] notified, she states she was misunderstood in the type of order she needs for this patient. The request has been sent to Dr. Doe in a separate phone message.   RN @ bedside. Dual skin assessment done. VS obtained. No s/s of distress noted.     Visit Vitals  /79   Pulse 95   Temp 98 °F (36.7 °C)   Resp 14   Ht 5' 7\" (1.702 m)   Wt 118.4 kg (261 lb 1 oz)   SpO2 98%   BMI 40.89 kg/m²     Date 01/28/20 0700 - 01/29/20 0659(Not Admitted) 01/29/20 0700 - 01/30/20 0659   Shift [id number] 1900-0659 24 Hour Total 0237-1639 3172-5426 24 Hour Total   INTAKE   I.V.    2000 2000     I.V.    200  200     Volume (lactated Ringers infusion)    1800  1800   Shift Total(mL/kg)    2000(16.9)  2000(16.9)   OUTPUT   Blood    50  50     Estimated Blood Loss    50  50   Shift Total(mL/kg)    50(0.4)  50(0.4)   NET    1950  1950   Weight (kg)    118.4 118.4 118.4

## 2020-01-29 NOTE — BRIEF OP NOTE
BRIEF OPERATIVE NOTE    Date of Procedure: 1/29/2020   Preoperative Diagnosis: OSTEOARTHRITIS LEFT KNEE  Postoperative Diagnosis: OSTEOARTHRITIS LEFT KNEE    Procedure(s):  LEFT TOTAL KNEE ARTHROPLASTY AND ALL INDICATED PROCEDURES  Surgeon(s) and Role:     * Lorna Allan MD - Primary         Surgical Assistant: Sj Mcqueen    Surgical Staff:  Circ-1: Amita Otero  Circ-2: Sandi Isbell  Scrub Tech-1: Kimberlee Ortega  Retractor Larsen: Lino Donister  Surg Asst-1: Linette Zuluaga  Event Time In Time Out   Incision Start 9327    Incision Close       Anesthesia: General   Estimated Blood Loss: 50 mL  Specimens: * No specimens in log *   Findings: DJD worst of the patellofemoral joint   Complications: none  Implants:   Implant Name Type Inv.  Item Serial No.  Lot No. LRB No. Used Action   CEMENT BNE GENTAMC GHV 40GM -- SMARTSET - [id number]  CEMENT  [employer] GHV 40GM -- SMARTSET   [employer] [id number] Left 1 Implanted   CEMENT BNE SMARTSET HV 40GM -- ORDER IN SETS OF 20 - [id number]  CEMENT BNE SMARTSET HV 40GM -- ORDER IN SETS OF 20  [facility] [id number] Left 1 Implanted   COMPNT FEM CR SZ 4 LT NP OXIN -- JOURNEY II - [id number]  COMPNT FEM CR SZ 4 LT NP OXIN -- JOURNEY II  [employer] [id number] Left 1 Implanted   NONONPOROUS TIBIAL BASEPLATE SIZE 4 LEFT    [employer] [id number] Left 1 Implanted   9MM ARTICULAR INSERT    [employer] [id number] Left 1 Implanted   32MM OVAL PATELLAR COMPONENT    [employer]  Left 1 Implanted

## 2020-01-29 NOTE — ANESTHESIA POSTPROCEDURE EVALUATION
Post-Anesthesia Evaluation and Assessment    Cardiovascular Function/Vital Signs  Visit Vitals  /50   Pulse 95   Temp 36.6 °C (97.9 °F)   Resp 13   Ht 5' 7\" (1.702 m)   Wt 118.4 kg (261 lb 1 oz)   SpO2 99%   BMI 40.89 kg/m²       Patient is status post Procedure(s):  LEFT TOTAL KNEE ARTHROPLASTY AND ALL INDICATED PROCEDURES. Nausea/Vomiting: Controlled. Postoperative hydration reviewed and adequate. Pain:  Pain Scale 1: FLACC(pt states \"achy\") (01/29/20 1230)  Pain Intensity 1: 0 (01/29/20 1209)   Managed. Neurological Status:   Neuro (WDL): Exceptions to WDL (01/29/20 1209)   At baseline. Mental Status and Level of Consciousness: Baseline and stable. Pulmonary Status:   O2 Device: Nasal cannula (01/29/20 1218)   Adequate oxygenation and airway patent. Complications related to anesthesia: None    Post-anesthesia assessment completed. No concerns. Patient has met all discharge requirements.     Signed By: Yulissa Hope MD

## 2020-01-29 NOTE — PROGRESS NOTES
1540  Pt ambulating in hallway with PT    1605  Assumed care of pt at this time. Assessment complete. Pt alert and oriented x 4. Denies SOB and chest pain. Pt lungs clear bilaterally. Cap refill  less than 3 seconds. Pt denies numbness and tingling to all extremities. Stated pain 4/10. Pt has 18 G IV to L hand. Pt has ACE bandage dressing to left knee CDI . Plexis bilaterally and TEDs in place to RLE. Pt encouraged to continue use of IS. Pt verbalized understanding. Ice pack applied. Call light and possessions within reach. Bed in low position. Will continue to monitor. Fall risk arm band in place    1845  Pt stated pain is 7/10. Medicated with 10mg of oxycodone    Shift summary  Pt is alert and oriented x 4. Pt had uneventful shift. Pt ambulating and voiding sufficient amounts. Pain controlled by PRN medication.

## 2020-01-29 NOTE — PROGRESS NOTES
1300  Received patient from PACU in satisfactory condition. VSS. Assumed care of patient. Patient is alert and oriented x 4. Patient is calm and cooperative. Patients PERRLA. No numbness or tingling to extremities. Radial and pedal pulses palpable and equal bilaterally. Capillary Refill < 3 seconds. Lung sounds CTA bilaterally. Respirations even and unlabored. No use of accessory muscles. Abdomen is soft and non-tender. Bowel sounds active to 4 quadrants. Patient has not voided post-operatively. No bladder distention evident. No complaints of bladder discomfort. Skin is warm , dry and skin color is appropriate to race. Ace elastic dressing to left knee noted CDI. No other skin integrity issues present. Giovanni hose applied to RLE. Plexi compression device applied to BLE. IV intact to left hand and infusing without difficulty. Reports pain 5/10. Patient educated on incentive spirometer use. Patient able to get to 2000 on IS. Patient oriented to call bell use as well as bed use. Patient oriented to phone and how to order meals. Call bell within reach. Bed in low position. Three side rails up.

## 2020-01-29 NOTE — PROGRESS NOTES
Problem: Falls - Risk of  Goal: *Absence of Falls  Description  Document Antony Schmid Fall Risk and appropriate interventions in the flowsheet.   Outcome: Progressing Towards Goal  Note: Fall Risk Interventions:            Medication Interventions: Patient to call before getting OOB                   Problem: Patient Education: Go to Patient Education Activity  Goal: Patient/Family Education  Outcome: Progressing Towards Goal     Problem: Patient Education: Go to Patient Education Activity  Goal: Patient/Family Education  Outcome: Progressing Towards Goal     Problem: Knee Replacement: Day of Surgery/Unit  Goal: Off Pathway (Use only if patient is Off Pathway)  Outcome: Progressing Towards Goal  Goal: Activity/Safety  Outcome: Progressing Towards Goal  Goal: Consults, if ordered  Outcome: Progressing Towards Goal  Goal: Diagnostic Test/Procedures  Outcome: Progressing Towards Goal  Goal: Nutrition/Diet  Outcome: Progressing Towards Goal  Goal: Medications  Outcome: Progressing Towards Goal  Goal: Respiratory  Outcome: Progressing Towards Goal  Goal: Treatments/Interventions/Procedures  Outcome: Progressing Towards Goal  Goal: Psychosocial  Outcome: Progressing Towards Goal  Goal: *Initiate mobility  Outcome: Progressing Towards Goal  Goal: *Optimal pain control at patient's stated goal  Outcome: Progressing Towards Goal  Goal: *Hemodynamically stable  Outcome: Progressing Towards Goal     Problem: Knee Replacement: Post-Op Day 1  Goal: Off Pathway (Use only if patient is Off Pathway)  Outcome: Progressing Towards Goal  Goal: Activity/Safety  Outcome: Progressing Towards Goal  Goal: Diagnostic Test/Procedures  Outcome: Progressing Towards Goal  Goal: Nutrition/Diet  Outcome: Progressing Towards Goal  Goal: Medications  Outcome: Progressing Towards Goal  Goal: Respiratory  Outcome: Progressing Towards Goal  Goal: Treatments/Interventions/Procedures  Outcome: Progressing Towards Goal  Goal: Psychosocial  Outcome: Progressing Towards Goal  Goal: Discharge Planning  Outcome: Progressing Towards Goal  Goal: *Demonstrates progressive activity  Outcome: Progressing Towards Goal  Goal: *Optimal pain control at patient's stated goal  Outcome: Progressing Towards Goal  Goal: *Hemodynamically stable  Outcome: Progressing Towards Goal  Goal: *Discharge plan identified  Outcome: Progressing Towards Goal     Problem: Pain  Goal: *Control of Pain  Outcome: Progressing Towards Goal     Problem: Patient Education: Go to Patient Education Activity  Goal: Patient/Family Education  Outcome: Progressing Towards Goal     Problem: Diabetes Self-Management  Goal: *Disease process and treatment process  Description  Define diabetes and identify own type of diabetes; list 3 options for treating diabetes. Outcome: Progressing Towards Goal  Goal: *Incorporating nutritional management into lifestyle  Description  Describe effect of type, amount and timing of food on blood glucose; list 3 methods for planning meals. Outcome: Progressing Towards Goal  Goal: *Incorporating physical activity into lifestyle  Description  State effect of exercise on blood glucose levels. Outcome: Progressing Towards Goal  Goal: *Developing strategies to promote health/change behavior  Description  Define the ABC's of diabetes; identify appropriate screenings, schedule and personal plan for screenings. Outcome: Progressing Towards Goal  Goal: *Using medications safely  Description  State effect of diabetes medications on diabetes; name diabetes medication taking, action and side effects. Outcome: Progressing Towards Goal  Goal: *Monitoring blood glucose, interpreting and using results  Description  Identify recommended blood glucose targets  and personal targets.   Outcome: Progressing Towards Goal  Goal: *Prevention, detection, treatment of acute complications  Description  List symptoms of hyper- and hypoglycemia; describe how to treat low blood sugar and actions for lowering  high blood glucose level. Outcome: Progressing Towards Goal  Goal: *Prevention, detection and treatment of chronic complications  Description  Define the natural course of diabetes and describe the relationship of blood glucose levels to long term complications of diabetes.   Outcome: Progressing Towards Goal  Goal: *Developing strategies to address psychosocial issues  Description  Describe feelings about living with diabetes; identify support needed and support network  Outcome: Progressing Towards Goal  Goal: *Insulin pump training  Outcome: Progressing Towards Goal  Goal: *Sick day guidelines  Outcome: Progressing Towards Goal  Goal: *Patient Specific Goal (EDIT GOAL, INSERT TEXT)  Outcome: Progressing Towards Goal     Problem: Patient Education: Go to Patient Education Activity  Goal: Patient/Family Education  Outcome: Progressing Towards Goal

## 2020-01-29 NOTE — ANESTHESIA PREPROCEDURE EVALUATION
Relevant Problems   No relevant active problems       Anesthetic History   No history of anesthetic complications            Review of Systems / Medical History  Patient summary reviewed, nursing notes reviewed and pertinent labs reviewed    Pulmonary  Within defined limits                 Neuro/Psych   Within defined limits           Cardiovascular    Hypertension              Exercise tolerance: >4 METS     GI/Hepatic/Renal  Within defined limits              Endo/Other    Diabetes    Arthritis     Other Findings              Physical Exam    Airway  Mallampati: III  TM Distance: 4 - 6 cm  Neck ROM: decreased range of motion   Mouth opening: Normal     Cardiovascular  Regular rate and rhythm,  S1 and S2 normal,  no murmur, click, rub, or gallop  Rhythm: regular  Rate: normal         Dental  No notable dental hx       Pulmonary  Breath sounds clear to auscultation               Abdominal  GI exam deferred       Other Findings            Anesthetic Plan    ASA: 3  Anesthesia type: general      Post-op pain plan if not by surgeon: peripheral nerve block single    Induction: Intravenous  Anesthetic plan and risks discussed with: Patient and Spouse      Procedure and alternatives explained. Risks explained including bleeding, infection, nerve injury, failed block. Procedure explained as elective. Pt understands and desires to proceed.

## 2020-01-29 NOTE — ROUTINE PROCESS
TRANSFER - IN REPORT: 
 
Verbal report received from Laura, [facility] on RACHEL Cabrera  being received from PACU for routine post - op. Report consisted of patients Situation, Background, Assessment and  
Recommendations(SBAR). Information from the following report(s) SBAR, Kardex, OR Summary, Intake/Output and MAR was reviewed with the receiving nurse. Opportunity for questions and clarification was provided. Assessment to be completed upon patients arrival to unit and care assumed.

## 2020-01-29 NOTE — PERIOP NOTES
Reviewed PTA medication list with patient/caregiver and patient/caregiver denies any additional medications. Patient admits to having a responsible adult care for them for at least 24 hours after surgery.     Dual skin assessment completed by Oren SHARMA and EMMY Gill RN.

## 2020-01-30 LAB
EST. AVERAGE GLUCOSE BLD GHB EST-MCNC: 131 MG/DL
GLUCOSE BLD STRIP.AUTO-MCNC: 160 MG/DL (ref 70–110)
GLUCOSE BLD STRIP.AUTO-MCNC: 173 MG/DL (ref 70–110)
GLUCOSE BLD STRIP.AUTO-MCNC: 178 MG/DL (ref 70–110)
GLUCOSE BLD STRIP.AUTO-MCNC: 212 MG/DL (ref 70–110)
HBA1C MFR BLD: 6.2 % (ref 4.2–5.6)

## 2020-01-30 PROCEDURE — 36415 COLL VENOUS BLD VENIPUNCTURE: CPT

## 2020-01-30 PROCEDURE — 74011636637 HC RX REV CODE- 636/637: Performed by: PHYSICIAN ASSISTANT

## 2020-01-30 PROCEDURE — 82962 GLUCOSE BLOOD TEST: CPT

## 2020-01-30 PROCEDURE — 97166 OT EVAL MOD COMPLEX 45 MIN: CPT

## 2020-01-30 PROCEDURE — 65270000029 HC RM PRIVATE

## 2020-01-30 PROCEDURE — 74011250636 HC RX REV CODE- 250/636: Performed by: ORTHOPAEDIC SURGERY

## 2020-01-30 PROCEDURE — 97535 SELF CARE MNGMENT TRAINING: CPT

## 2020-01-30 PROCEDURE — 74011636637 HC RX REV CODE- 636/637: Performed by: ORTHOPAEDIC SURGERY

## 2020-01-30 PROCEDURE — 83036 HEMOGLOBIN GLYCOSYLATED A1C: CPT

## 2020-01-30 PROCEDURE — 97110 THERAPEUTIC EXERCISES: CPT

## 2020-01-30 PROCEDURE — 97116 GAIT TRAINING THERAPY: CPT

## 2020-01-30 PROCEDURE — 74011250637 HC RX REV CODE- 250/637: Performed by: ORTHOPAEDIC SURGERY

## 2020-01-30 RX ORDER — INSULIN GLARGINE 100 [IU]/ML
15 INJECTION, SOLUTION SUBCUTANEOUS DAILY
Status: DISCONTINUED | OUTPATIENT
Start: 2020-01-30 | End: 2020-01-31 | Stop reason: HOSPADM

## 2020-01-30 RX ORDER — INSULIN LISPRO 100 [IU]/ML
0.05 INJECTION, SOLUTION INTRAVENOUS; SUBCUTANEOUS
Status: DISCONTINUED | OUTPATIENT
Start: 2020-01-30 | End: 2020-01-31 | Stop reason: HOSPADM

## 2020-01-30 RX ADMIN — SENNOSIDES AND DOCUSATE SODIUM 1 TABLET: 8.6; 5 TABLET ORAL at 20:08

## 2020-01-30 RX ADMIN — GLYBURIDE 5 MG: 5 TABLET ORAL at 06:10

## 2020-01-30 RX ADMIN — OXYCODONE 5 MG: 5 TABLET ORAL at 21:48

## 2020-01-30 RX ADMIN — ONDANSETRON 4 MG: 2 INJECTION INTRAMUSCULAR; INTRAVENOUS at 02:12

## 2020-01-30 RX ADMIN — OXYCODONE 10 MG: 5 TABLET ORAL at 07:51

## 2020-01-30 RX ADMIN — OXYCODONE 10 MG: 5 TABLET ORAL at 13:10

## 2020-01-30 RX ADMIN — OXYCODONE 10 MG: 5 TABLET ORAL at 17:10

## 2020-01-30 RX ADMIN — INSULIN LISPRO 6 UNITS: 100 INJECTION, SOLUTION INTRAVENOUS; SUBCUTANEOUS at 11:41

## 2020-01-30 RX ADMIN — INSULIN LISPRO 2 UNITS: 100 INJECTION, SOLUTION INTRAVENOUS; SUBCUTANEOUS at 21:48

## 2020-01-30 RX ADMIN — SENNOSIDES AND DOCUSATE SODIUM 1 TABLET: 8.6; 5 TABLET ORAL at 09:17

## 2020-01-30 RX ADMIN — ACETAMINOPHEN 1000 MG: 500 TABLET ORAL at 16:29

## 2020-01-30 RX ADMIN — CEFAZOLIN SODIUM 2 G: 2 SOLUTION INTRAVENOUS at 01:18

## 2020-01-30 RX ADMIN — ACETAMINOPHEN 1000 MG: 500 TABLET ORAL at 23:21

## 2020-01-30 RX ADMIN — Medication 1 CAPSULE: at 09:17

## 2020-01-30 RX ADMIN — INSULIN LISPRO 2 UNITS: 100 INJECTION, SOLUTION INTRAVENOUS; SUBCUTANEOUS at 11:41

## 2020-01-30 RX ADMIN — OLMESARTAN MEDOXOMIL 20 MG: 20 TABLET, FILM COATED ORAL at 09:17

## 2020-01-30 RX ADMIN — ACETAMINOPHEN 1000 MG: 500 TABLET ORAL at 01:18

## 2020-01-30 RX ADMIN — LORATADINE 10 MG: 10 TABLET ORAL at 09:17

## 2020-01-30 RX ADMIN — KETOROLAC TROMETHAMINE 15 MG: 30 INJECTION, SOLUTION INTRAMUSCULAR at 01:18

## 2020-01-30 RX ADMIN — INSULIN GLARGINE 15 UNITS: 100 INJECTION, SOLUTION SUBCUTANEOUS at 10:28

## 2020-01-30 RX ADMIN — FERROUS SULFATE TAB 325 MG (65 MG ELEMENTAL FE) 325 MG: 325 (65 FE) TAB at 07:51

## 2020-01-30 RX ADMIN — INSULIN LISPRO 2 UNITS: 100 INJECTION, SOLUTION INTRAVENOUS; SUBCUTANEOUS at 17:09

## 2020-01-30 RX ADMIN — INSULIN LISPRO 6 UNITS: 100 INJECTION, SOLUTION INTRAVENOUS; SUBCUTANEOUS at 07:51

## 2020-01-30 RX ADMIN — HYDROCHLOROTHIAZIDE 12.5 MG: 12.5 CAPSULE ORAL at 09:17

## 2020-01-30 RX ADMIN — ACETAMINOPHEN 1000 MG: 500 TABLET ORAL at 07:51

## 2020-01-30 RX ADMIN — Medication 10 ML: at 15:00

## 2020-01-30 RX ADMIN — EZETIMIBE 10 MG: 10 TABLET ORAL at 21:47

## 2020-01-30 RX ADMIN — Medication 81 MG: at 20:08

## 2020-01-30 RX ADMIN — ONDANSETRON 4 MG: 2 INJECTION INTRAMUSCULAR; INTRAVENOUS at 09:15

## 2020-01-30 RX ADMIN — KETOROLAC TROMETHAMINE 15 MG: 30 INJECTION, SOLUTION INTRAMUSCULAR at 11:42

## 2020-01-30 RX ADMIN — KETOROLAC TROMETHAMINE 15 MG: 30 INJECTION, SOLUTION INTRAMUSCULAR at 06:10

## 2020-01-30 RX ADMIN — ROSUVASTATIN CALCIUM 40 MG: 10 TABLET, COATED ORAL at 21:47

## 2020-01-30 RX ADMIN — Medication 81 MG: at 09:17

## 2020-01-30 RX ADMIN — INSULIN LISPRO 6 UNITS: 100 INJECTION, SOLUTION INTRAVENOUS; SUBCUTANEOUS at 17:10

## 2020-01-30 RX ADMIN — Medication 10 ML: at 21:48

## 2020-01-30 NOTE — PROGRESS NOTES
2043   pt is awake, alert, oriented x4. Sitting on chair. Ace wrap dressing to left leg dry and intact. CL Goodrich in to see pt. Plan for pt is to discharge pt tomorrow. 0751   Pt medicated with oxycodone 10 mg po for pain level 6-7/10.    0919   Pt medicated with zofran 4 mg IV for nausea. 1100  Pt was seen by PT. Ambulated with PT in hallway. [address] was removed. Optifoam dressing dry and intact. TEDS applied. 1310   Pain level 7/10. Pt medicated with oxycodone 10 mg po. Pt ambulated to BR to void then back in bed. [address] on and off. Pain level 5/10  1630   Pt is sleeping on and off but wakes up easily to verbal stimuli. Pain level is 6/10.    1710   Pt medicated with Oxycodone 10 mg po for pain level 6-7/10. Family at bedside.

## 2020-01-30 NOTE — ROUTINE PROCESS
Bedside and Verbal shift change report given to Ezio Bourne RN (oncoming nurse) by Alta French RN (offgoing nurse). Report included the following information SBAR, Kardex, MAR and Recent Results.

## 2020-01-30 NOTE — OP NOTES
[address]  [address] [place] [address], [phone]    LEFT TOTAL KNEE ARTHROPLASTY    Patient: Tommy Fowler MRN: [id number]  SSN: [id number]    YOB: 1965  Age: 53 y.o. Sex: male      Date of Surgery: 1/29/2020   Preoperative Diagnosis: OSTEOARTHRITIS LEFT KNEE   Postoperative Diagnosis: OSTEOARTHRITIS LEFT KNEE   Location: [facility]  Surgeon: Narcisa Rajan MD  Physician Assistant: none  Assistant: Circ-1: Ayan Pearce  Circ-2: Patty Lynne  Scrub Tech-1: Promise Levy  Retractor Larsen: Olevia Aase  Surg Asst-1: Georgina Santana    Anesthesia: General + Low femoral Nerve Block + local    Procedure: Left Total Knee Arthroplasty (CPT: 14227)    Findings:  Degenerative joint disease of the left knee     Estimated Blood Loss: 50 mL    Fluids: see anesthesia record    Specimens: None    Cultures: None    Complications: None    Tourniquet Time:   Total Tourniquet Time Documented:  Thigh (Left) - 60 minutes  Total: Thigh (Left) - 60 minutes    Tourniquet Pressure: 280 mm Hg    Tranexamic Acid: 1 gram IV: one dose at begining of case and one at the end    Exparel solution: 20 mL (13 mg/mL) + 40 mL NS    Implants:   Implant Name Type Inv.  Item Serial No.  Lot No. LRB No. Used Action   CEMENT BNE GENTAMC GHV 40GM -- SMARTSET - [id number]  CEMENT  [employer] GHV 40GM -- SMARTSET  [facility] [employer] [id number] Left 1 Implanted   CEMENT BNE SMARTSET HV 40GM -- ORDER IN SETS OF 20 - [id number]  CEMENT BNE SMARTSET HV 40GM -- ORDER IN SETS OF 20  [facility] [employer] [id number] Left 1 Implanted   COMPNT FEM CR SZ 4 LT NP OXIN -- JOURNEY II - [id number]  COMPNT FEM CR SZ 4 LT NP OXIN -- JOURNEY II  [employer] [id number] Left 1 Implanted   NONONPOROUS TIBIAL BASEPLATE SIZE 4 LEFT    [employer] [id number] Left 1 Implanted   9MM ARTICULAR INSERT    [employer] [id number] Left 1 Implanted   32MM OVAL PATELLAR COMPONENT    [employer] [id number] Left 1 Implanted   KNEE JOURNEY PFJ OXIN W/GEN2 -- K5 BSV - SCONSTRUCT  KNEE JOURNEY PFJ OXIN W/GEN2 -- K5 BSV CONSTRUCT [employer]  Left 1 Implanted        Patient Condition: Stable.    ---------  INDICATIONS:   This 53y.o.-year-old male has had pain in the left knee for years and has become functionally disabled with respect to the activities of daily living. The pain is increased with weight-bearing. The pain and dysfunction were not releaved by at least three months of conservative therapy such as NSAIDS (ibuprofen, aleve), analgesics (tylenol), structured flexibility and muscle strengthening physical therapy exercises, activity restrictions, intra-articular cortisone injections, bracing, and use of a cane. The radiographs showed neutral alignment and advanced arthritis with joint space narrowing, subchondral sclerosis, and periarticular osteophytes - worst of the patellofemoral joint. A left total knee replacement has been recommended. The risks and the possible complications of this surgery and anesthesia including, but not exclusive to, bleeding, infection, dislocation, fracture, blood clots, nerve and vascular injury, possible need for other procedures, and possibly death have been explained to the patient. The patient accepts these risks for the benefits of joint replacement over the failure of non-operative care to provide adequate pain relief and improve their functional disability. The patients medical problems have been addressed by their primary care physician and are deemed stable and as well controlled as possible. Inpatient hospital care was medically necessary, reasonable, and appropriate. This is a medically necessary procedure.   As such, without use of a surgical assistant to retract soft tissues, protect vital neuro-vascular structures and assist in the technical aspects of the operation, this procedure would not have been possible. Therefore this assistant was medically necessary. DESCRIPTION OF PROCEDURE:    After the risks and benefits of surgery were discussed, informed consent was obtained. The patient was identified in the preoperative holding area and the operative extremity was marked. Preoperatively a low femoral nerve block was performed by anesthesia. The patient was taken to the operating room and placed in the supine position. General anesthesia was performed. IV antibiotics were given. After verification of the appropriate operative site from the consent form, with confirmation of surgeon, anesthesia and nursing teams, a tourniquet was placed and the left leg was prepped and draped in sterile fashion using Chloraprep. A formal timeout was performed. The tourniquet was inflated and a midline incision was made over the anterior knee medial to the tibial tubercle and the dissection was taken down to Meka's fascia. A medial parapatellar arthrotomy was performed, medial release was made and the infrapatellar fat pad was trimmed. The anterior portions of the medial and lateral menisci were excised. The patella measured 23 mm. A freehand patellar cut was made followed by placement of the protective plate. The knee was flexed and the patella was  allowed to subluxate into the lateral gutter and the knee was flexed. Inspection of the knee revealed cartilage loss from all three compartments greatest of the patellofemoral joint. The femur was drilled and intramedullary cutting jig was placed in 5 degrees of valgus and 9 mm of distal resection. The distal femoral cut was made. The tibia was then subluxed anteriorly with a large bent knee retractor. The PCL was released from the posterior tibia. The remaining medial and lateral menisci and the periarticular osteophytes were removed. The lateral genicular artery was cauterized.   An extramedullary tibial guide was used and a tibial cut was made just inferior to the osteoarticular junction with tibial slope of 7 degrees - matching the patient's native slope. The extension gap was assessed to ensure full extension could be achieved. A sizing tibial plate was then pinned into place and alignment was checked. The tibia was reamed and broached and any additional osteophytes were removed. The femoral sizer was used to measure the femoral size as well as estimate femoral component rotation using the posterior condylar line as a reference. Gap measuring blocks and the gap /sizing device was then used to examine flexion and extension gaps and confirm femoral component size and rotation. Positioning pins for the distal femoral cutting block were placed into the femur through the /sizing device. The femoral cutting block was placed. Anterior-posterior position of the cutting guide was checked using a [employer]. The anterior, posterior and chamfer cuts were made. Posterior osteophytes were removed. Tibial and femoral trial implants were placed. The trochlear sulcus bone was removed. The patella preparation was then completed with sizing and drilling of the patellar lugs. The trial patellar implant was placed and measured 23 mm. Patellar tracking was slightly lateral so a slight all inside lateral retinacular release was needed. Ligament balancing was performed as needed with release of MCL done on approach. The PCL was intact so a cruciate-retaining implant was used. Excellent stability was achieved. The trial components were removed. 20 cc of Exparel solution and 10 cc of 0.25% Marcaine were injected into the posterior soft tissues. Care was taken to aspirate prior to injecting to prevent intravascular anesthetic injection. The cement was prepared.   After preparing the bony surfaces with pulsatile lavage saline, the real components were cemented into place with the final liner.  Excess cement was removed prior to hardening with the cement allowed to set up with axial pressure across the knee in full extension. The remaining 40 cc of Exparel solution and an additional 20 cc of 0.25% Marcaine were injected into the deep and superficial soft tissues around the knee as well as the periosteum. After drying of the cement, the knee was checked for any remaining excess cement and irrigated. The tourniquet was released and hemostasis was achieved. A standard layered closure was performed using #1 Stratafix PDS for the fascia, 0 and 3-0 Vicryl for the subcutaneous and subcuticular layers followed by a running subcuticular skin closure with a 3-0 Monocryl. PRINEO was applied. Sterile dressings were placed. The patient was awakened and there were no complications. Final sponge and needle counts were correct x2.     Eric Looney MD

## 2020-01-30 NOTE — PROGRESS NOTES
Problem: Mobility Impaired (Adult and Pediatric)  Goal: *Acute Goals and Plan of Care (Insert Text)  Description  In 1-7 days pt will be able to perform:  ST.  Bed mobility:  Rolling L to R to L modified independent for positioning. 2.  Supine to sit to supine S with HR for meals. 3.  Sit to stand to sit S with RW in prep for ambulation. LT.  Gait:  Ambulate >150ft S with RW, WBAT, for home/community mobility. 2.  Stair Negotiation:  Ascend/descend >4 steps CGA with HR for home entry. 3.  Activity tolerance: Tolerate up in chair 1-2 hours for ADLs. 4.  Patient/Family Education:  Patient/family to be independent with HEP for follow-up care and safe discharge. Outcome: Progressing Towards Goal   PHYSICAL THERAPY TREATMENT    Patient: Jony Weaver (53 y.o. male)  Date: 2020  Diagnosis: Total knee replacement status, left [Z96.652]   <principal problem not specified>  Procedure(s) (LRB):  LEFT TOTAL KNEE ARTHROPLASTY AND ALL INDICATED PROCEDURES (Left) 1 Day Post-Op  Precautions: Fall, WBAT  PLOF: independent, ambulatory without AD, has a RW    ASSESSMENT:  Pt utilized self assist [employer] tech to assist RLE in/out of bed without prompting. Bed elevated to height of bed at home, no difficulty performing sit to stand. Ambulated with RW 180ft, step to gt pattern, steady slow pace, no LOB or knee buckling noted. Reviewed and performing seated ROM strengthening exercises. Pt left supine in bed. Progression toward goals:   [x]      Improving appropriately and progressing toward goals  []      Improving slowly and progressing toward goals  []      Not making progress toward goals and plan of care will be adjusted     PLAN:  Patient continues to benefit from skilled intervention to address the above impairments. Continue treatment per established plan of care.   Discharge Recommendations:  Home Health  Further Equipment Recommendations for Discharge:  rolling walker     SUBJECTIVE:   Patient stated this is my 3rd one.     OBJECTIVE DATA SUMMARY:   Critical Behavior:  Neurologic State: Alert  Orientation Level: Oriented X4  Cognition: Appropriate decision making, Appropriate for age attention/concentration, Appropriate safety awareness, Follows commands  Safety/Judgement: Awareness of environment, Fall prevention, Insight into deficits  Functional Mobility Training:  Bed Mobility:    Supine to Sit: Modified independent  Sit to Supine: Modified independent  Scooting: Supervision    Transfers:  Sit to Stand: Supervision  Stand to Sit: Supervision    Bed to Chair: Supervision; Adaptive equipment  Balance:  Sitting: Intact  Standing: Intact; With support   Ambulation/Gait Training:  Distance (ft): 180 Feet (ft)  Assistive Device: Gait belt;Walker, rolling  Ambulation - Level of Assistance: Stand-by assistance;Supervision  Gait Abnormalities: Antalgic; Step to gait  Left Side Weight Bearing: As tolerated    Speed/Marielos: Slow  Therapeutic Exercises:   LLE      EXERCISE   Sets   Reps   Active Active Assist   Passive Self ROM   Comments   Ankle Pumps    [] [] [] []    Quad Sets/Glut Sets    [] [] [] [] Hold for 5 secs   Hamstring Sets   [] [] [] []    Short Arc Quads   [] [] [] []    Heel Slides   [] [] [] []    Straight Leg Raises   [] [] [] []    Hip Add   [] [] [] [] Hold for 5 secs, w/ pillow squeeze   Long Arc Quads  5 [x] [] [] []    Seated HS  5 [x] [] [] []    Assisted LAQ  5 [x] [] [] []       [] [] [] []        Pain:  Pain level pre-treatment: 4-5/10  Pain level post-treatment: 4-5/10   Pain Intervention(s): Medication (see MAR); Rest, Ice, Repositioning   Response to intervention: Nurse notified, See doc flow    Activity Tolerance:   Good  Please refer to the flowsheet for vital signs taken during this treatment.   After treatment:   [] Patient left in no apparent distress sitting up in chair  [x] Patient left in no apparent distress in bed  [x] Call bell left within reach  [] Nursing notified  [] Caregiver present  [] Bed alarm activated  [x] SCDs applied      COMMUNICATION/EDUCATION:   [x]         Role of Physical Therapy in the acute care setting. [x]         Fall prevention education was provided and the patient/caregiver indicated understanding. [x]         Patient/family have participated as able in working toward goals and plan of care. [x]         Patient/family agree to work toward stated goals and plan of care. []         Patient understands intent and goals of therapy, but is neutral about his/her participation.   []         Patient is unable to participate in stated goals/plan of care: ongoing with therapy staff.  []         Other:        Alva Carter PTA   Time Calculation: 24 mins

## 2020-01-30 NOTE — PROGRESS NOTES
Progress Note        Patient: Jony Weaver MRN: [id number]  SSN: [id number]    YOB: 1965  Age: 53 y.o. Sex: male      1 Day Post-Op status post Procedure(s) (LRB):  LEFT TOTAL KNEE ARTHROPLASTY AND ALL INDICATED PROCEDURES (Left)    Admit Date: 2020  Admit Diagnosis: Total knee replacement status, left [Z96.652]    Subjective:       Doing well. No complaints. No SOB. No Chest Pain. No Nausea or Vomiting. No problems eating or voiding. Objective:        Temp (24hrs), Av.1 °F (36.7 °C), Min:97.7 °F (36.5 °C), Max:99.1 °F (37.3 °C)    Body mass index is 40.89 kg/m². Patient Vitals for the past 12 hrs:   BP Temp Pulse Resp SpO2   20 0817 110/50 97.8 °F (36.6 °C) 94 16 98 %   20 0353 103/53 97.9 °F (36.6 °C) 93 16 97 %   20 2252 111/65 98.1 °F (36.7 °C) 95 16 96 %     No results for input(s): HGB, HCT, INR, NA, K, CL, CO2, BUN, CREA, GLU, HGBEXT, HCTEXT, INREXT in the last 72 hours. Physical Exam:  Vital Signs are Stable. No Acute Distress. Alert and Oriented. Negative Homans sign. Toes AROM Full. Neurovascular exam is normal.    Dressing is Clean, Dry, and Intact. Assessment/Plan:     1. Patient doing well. 2. Out of BED / PT / WBAT  3. DVT ppx: Mobilization, Ecotrin, NICANOR hose, and mechanical compression  4.  D/c planning    Continue PT/OT  Continue ROM    Discharge Plan: Home with [facility] tomorrow Pending PT clearance and oral analgesia      Signed By: Padmini Fan PA-C     2020

## 2020-01-30 NOTE — PROGRESS NOTES
1928 - Assumed care at this time. Pt resting quietly in bed. No signs of distress. Will continue to monitor. Pt encouraged to call for assistance. 2033 - Patient in bed at this time. Patient A&Ox4, RA. Denies chest pain and SOB. 18G IV to left hand  intact and patent. Plexi compression device bilaterally. NICANOR to RLE. ACE dressing to LLE CDI. Denies tingling/calf, c/o numbness to left knee pain. Pain 4/10 with a tolerable level of 4/10. Pt educated on IS use, q2h rounds, pain management, CHG wipes and \"Up for Meals\". Pt verbalized understanding, no concerns voiced. Call bell within reach, bed in lowest position. Pt encouraged to call for assistance. 0159 - Patient ambulated OOB to BR with steady gait. No concerns voiced. Call bell within reach, bed in lowest position. Pt encouraged to call for assistance.    0410 - Patient ambulated OOB to BR with steady gait. CHG wipes completed. No concerns voiced. Pt up in chair. Call bell and telephone within reach. Pt encouraged to call for assistance.

## 2020-01-30 NOTE — PROGRESS NOTES
Problem: Mobility Impaired (Adult and Pediatric)  Goal: *Acute Goals and Plan of Care (Insert Text)  Description  In 1-7 days pt will be able to perform:  ST.  Bed mobility:  Rolling L to R to L modified independent for positioning. 2.  Supine to sit to supine S with HR for meals. 3.  Sit to stand to sit S with RW in prep for ambulation. LT.  Gait:  Ambulate >150ft S with RW, WBAT, for home/community mobility. 2.  Stair Negotiation:  Ascend/descend >4 steps CGA with HR for home entry. 3.  Activity tolerance: Tolerate up in chair 1-2 hours for ADLs. 4.  Patient/Family Education:  Patient/family to be independent with HEP for follow-up care and safe discharge. 2020 1417 by Cory Pelayo, PTA  Outcome: Progressing Towards Goal  2020 1105 by Cory Pelayo, PTA  Outcome: Progressing Towards Goal   PHYSICAL THERAPY TREATMENT    Patient: Marylou Gonzalez (53 y.o. male)  Date: 2020  Diagnosis: Total knee replacement status, left [Z96.652]   <principal problem not specified>  Procedure(s) (LRB):  LEFT TOTAL KNEE ARTHROPLASTY AND ALL INDICATED PROCEDURES (Left) 1 Day Post-Op  Precautions: Fall, WBAT  PLOF: independent, ambulatory without AD, has a RW    ASSESSMENT:  Pt utilized self assist hook tech to assist LLE out of bed. Bed elevate to height of bed at home, no difficulty performing sit to stand. Ambulated 180ft with RW, step to gt pattern, steady slow pace, no LOB or knee buckling noted. Pt left sitting in chair, visitor present. Progression toward goals:   [x]      Improving appropriately and progressing toward goals  []      Improving slowly and progressing toward goals  []      Not making progress toward goals and plan of care will be adjusted     PLAN: stairs and supine TE next session  Patient continues to benefit from skilled intervention to address the above impairments. Continue treatment per established plan of care.   Discharge Recommendations:  Home Health  Further Equipment Recommendations for Discharge:  rolling walker     SUBJECTIVE:   Patient stated \"My knee has been sore since I tried bending earlier.     OBJECTIVE DATA SUMMARY:   Critical Behavior:  Neurologic State: Alert  Orientation Level: Oriented X4  Cognition: Appropriate decision making, Appropriate for age attention/concentration, Appropriate safety awareness, Follows commands  Safety/Judgement: Awareness of environment, Fall prevention, Insight into deficits  Functional Mobility Training:  Bed Mobility:    Supine to Sit: Modified independent  Sit to Supine: Modified independent  Scooting: Supervision  Transfers:  Sit to Stand: Supervision  Stand to Sit: Supervision  Bed to Chair: Supervision; Adaptive equipment  Balance:  Sitting: Intact  Standing: Intact; With support   Ambulation/Gait Training:  Distance (ft): 180 Feet (ft)  Assistive Device: Gait belt;Walker, rolling  Ambulation - Level of Assistance: Stand-by assistance;Supervision  Gait Abnormalities: Antalgic; Step to gait  Left Side Weight Bearing: As tolerated  Speed/Marielos: Slow      Pain:  Pain level pre-treatment: 6/10  Pain level post-treatment: 6/10   Pain Intervention(s): Medication (see MAR); Rest, Ice, Repositioning   Response to intervention: Nurse notified, See doc flow    Activity Tolerance:   Good  Please refer to the flowsheet for vital signs taken during this treatment. After treatment:   [x] Patient left in no apparent distress sitting up in chair  [] Patient left in no apparent distress in bed  [x] Call bell left within reach  [] Nursing notified  [x] Caregiver present  [] Bed alarm activated  [] SCDs applied      COMMUNICATION/EDUCATION:   [x]         Role of Physical Therapy in the acute care setting. [x]         Fall prevention education was provided and the patient/caregiver indicated understanding. [x]         Patient/family have participated as able in working toward goals and plan of care.   [x] Patient/family agree to work toward stated goals and plan of care. []         Patient understands intent and goals of therapy, but is neutral about his/her participation.   []         Patient is unable to participate in stated goals/plan of care: ongoing with therapy staff.  []         Other:        Adonay Vazquez PTA   Time Calculation: 16 mins

## 2020-01-30 NOTE — ROUTINE PROCESS
Bedside and Verbal shift change report given to Claudia Hurst RN by Cony Hutchins RN. Report included the following information SBAR, Kardex, OR Summary, Intake/Output and MAR.

## 2020-01-30 NOTE — PROGRESS NOTES
Problem: Self Care Deficits Care Plan (Adult)  Goal: *Acute Goals and Plan of Care (Insert Text)  Description  Initial Occupational Therapy Goals (1/30/2020) Within 7 day(s):    1. Patient will perform grooming standing sinkside with Supervision for increased independence in ADLs. 2. Patient will perform UB dressing with setup seated EOB for increased independence with ADLs. 3. Patient will perform LB dressing with setup/Allan & A/E PRN for increased independence with ADLs. 4. Patient will perform all aspects of toileting with Supervision for increased independence with ADLs. 5. Patient will perform LB ADLs utilizing body mechanics & adaptive strategies with 1 verbal cue for increased safety in ADLs. 6. Patient will independently apply energy conservation techniques with 1 verbal cue(s)for increased independence with ADLs. Outcome: Resolved/Met     OCCUPATIONAL THERAPY EVALUATION/DISCHARGE    Patient: Ashok Queen (53 y.o. male)  Date: 1/30/2020  Primary Diagnosis: Total knee replacement status, left [Z96.652]  Procedure(s) (LRB):  LEFT TOTAL KNEE ARTHROPLASTY AND ALL INDICATED PROCEDURES (Left) 1 Day Post-Op   Precautions: L TKA,  Fall, WBAT  PLOF: Patient was mod I at baseline; pt works maintenance at a [id number]    ASSESSMENT AND RECOMMENDATIONS:  Based on the objective data described below, the patient presents with LLE decreased ROM and strength affecting LE ADLs. Patient able to utilize RLE side sitting technique and bending forward with LLE for sock donning, but with compromised respiratory status. Spouse present and reports she can assist until pt able to complete. Pt will require assist w/ compression hose which patient reports spouse can assist.  Griffin Else on compensatory strategies for compression hose donning utilizing plastic bag over foot, including removal once donned. Pt able to stand for toileting and simple grooming at sink.      Education: Reviewed home safety, body mechanics, importance of moving every hour to prevent joint stiffness, role of ice for edema/pain control, Rolling Walker management/safety, and adaptive dressing techniques with patient verbalizing  understanding at this time     Skilled Occupational Therapy is not indicated at this time in this setting. Patient should continue to improve as pain and ROM/strength improves. Discharge Recommendations: Home Health  Further Equipment Recommendations for Discharge: To Be Determined (TBD) at next level of care       SUBJECTIVE:   Patient stated It's not too bad.  (re: pain)    OBJECTIVE DATA SUMMARY:     Past Medical History:   Diagnosis Date    Arthritis     osteo- knees    Diabetes ([facility] [address] 2011    Hypercholesteremia     Hypertension 2011    Ill-defined condition     hypercholesterolemia     Past Surgical History:   Procedure Laterality Date    HX HEENT  2013    deviated septum repair    HX KNEE ARTHROSCOPY Left     X 2     Barriers to Learning/Limitations: None  Compensate with: visual, verbal, tactile, kinesthetic cues/model    Home Situation/Prior level of Function: Pt enjoys watching sports; target shooting w/ his son  Home Situation  Home Environment: Private residence  # Steps to Enter: 5  Rails to Enter: Yes  Hand Rails : Bilateral  One/Two Story Residence: Two story  # of Interior Steps: 15  Interior Rails: Left  Lift Chair Available: No  Living Alone: No  Support Systems: Spouse/Significant Other/Partner  Patient Expects to be Discharged to[de-identified] Private residence  Current DME Used/Available at Home: Walker, rolling  Tub or Shower Type: Shower(on 2nd floor; 1/2 bath on 1st)  [x]  Right hand dominant   []  Left hand dominant    Cognitive/Behavioral Status:  Neurologic State: Alert  Orientation Level: Oriented X4  Cognition: Appropriate decision making; Appropriate for age attention/concentration; Appropriate safety awareness; Follows commands  Safety/Judgement: Awareness of environment; Fall prevention; Insight into deficits    Skin: L knee incision w/ Mepilex   Edema: compression hose in place & applied ice     Coordination:  Coordination: Within functional limits  Fine Motor Skills-Upper: Left Intact; Right Intact    Gross Motor Skills-Upper: Left Intact; Right Intact    Balance:  Sitting: Intact  Standing: Intact; With support    Strength: BUE  Strength: Generally decreased, functional    Tone & Sensation:BUE  Tone: Normal  Sensation: Intact    Range of Motion:BUE  AROM: Generally decreased, functional    Functional Mobility and Transfers for ADLs:  Bed Mobility:  Supine to Sit: Supervision  Sit to Supine: Supervision  Scooting: Supervision  Transfers:  Sit to Stand: Supervision; Adaptive equipment  Bed to Chair: Supervision; Adaptive equipment   Toilet Transfer : Supervision; Adaptive equipment    ADL Assessment/Intervention:  Feeding: Independent  Oral Facial Hygiene/Grooming: Supervision  Bathing: Setup; Additional time  Upper Body Dressing: Setup  Lower Body Dressing: Minimum assistance  Toileting: Supervision    Feeding  Drink to Mouth: Independent  Grooming  Position Performed: Standing  Washing Hands: Supervision;Modified independent    Upper Body Dressing Assistance  Pullover Shirt: Set-up  Lower Body Dressing Assistance  Socks: Minimum assistance; Compensatory technique training  Antiembolitic Stockings: Moderate assistance  Position Performed: Seated edge of bed    LE Adaptive Equipment:  [x] Adaptive Equipment was not issued due patient able to complete with out use of AE and use of AE would prevent stretching needed to progress with recovery. (Pt able to complete w/ compensatory strategies and able to compensate for socks w/ clothing modifications, but will require assist with Compression hose). Toileting  Bladder Hygiene: Supervision  Clothing Management: Supervision  Adaptive Equipment: Walker    Cognitive Retraining  Safety/Judgement: Awareness of environment; Fall prevention; Insight into deficits    Pain:  Pain level pre-treatment: 2/10  Pain level post-treatment: 2/10  Pain Intervention(s): Medication administer by Nursing (see MAR); Rest, Ice, Repositioning   Response to intervention: Nurse notified, see doc flow sheet    Activity Tolerance:   Fair. Patient able to stand 2-3 minute(s). Patient able to complete ADLs with intermittent rest breaks. Patient limited by pain, ROM, strength, habitus. Please refer to the flowsheet for vital signs taken during this treatment. After treatment:   []  Patient left in no apparent distress sitting up in chair  []  Patient sitting on EOB  [x]  Patient left in no apparent distress in bed  [x]  Call bell left within reach  [x]  Nursing notified  [x]  Caregiver present/spouse  [x]  Ice applied  [x]  SCD's on while back in bed    COMMUNICATION/EDUCATION:   Communication/Collaboration:  [x]       Role of Occupational Therapy in the acute care setting. [x]      Home safety education was provided and the patient/caregiver indicated understanding. [x]      Patient/family have participated as able in goal setting and plan of care. [x]      Patient/family agree to work toward stated goals and plan of care. []      Patient understands intent and goals of therapy, but is neutral about his/her participation. []      Patient is unable to participate in plan of care at this time. Thank you for this referral.  Jazmyne Urbano, OTR/L, CSRS  Time Calculation: 25 mins    Eval Complexity: History: HIGH Complexity : Extensive review of history including physical, cognitive and psychosocial history ; Examination: MEDIUM Complexity : 3-5 performance deficits relating to physical, cognitive , or psychosocial skils that result in activity limitations and / or participation restrictions; Decision Making:MEDIUM Complexity : Patient may present with comorbidities that affect occupational performnce.  Miniml to moderate modification of tasks or assistance (eg, physical or verbal ) with assesment(s) is necessary to enable patient to complete evaluation

## 2020-01-30 NOTE — DIABETES MGMT
GLYCEMIC CONTROL PROGRESS NOTE:    - known h/o T2DM, HbA1C ordered per protocol, BID mixed insulin/oral home regimen  - Decadron 4 mg x1, steroid-induced hyperglycemia  - BG out of target range non-ICU: < 180 mg/dL  - BG trending down this AM recommend the following regimen (orders entered with permission from CL Brandt)   *Lantus 20 units daily give now   *Humalog 8 units qac  - GC will continue to monitor and make adjustments as needed  Recent Glucose Results:   Lab Results   Component Value Date/Time    GLUCPOC 160 (H) 01/30/2020 11:18 AM    GLUCPOC 212 (H) 01/30/2020 07:00 AM    GLUCPOC 264 (H) 01/29/2020 10:18 PM     Osmani Rosario MS, RN, CDE  Glycemic Control Team  [phone]  Pager [phone] (M-TH 8:00-4:30P)  *After Hours pager [phone]

## 2020-01-30 NOTE — PROGRESS NOTES
Transition of Care (JONATHAN) Plan:     Chart reviewed, met with pt in room. Pt planning discharge home tomorrow, states he has family at home to assist. [address] offered, pt chose [facility] [phone] for follow up; referral placed with CMS. Pt has RW for home. JONATHAN Transportation:   How is patient being transported at discharge? Family/Friend      When? Once cleared by Therapy between 12-2pm     Is transport scheduled? N/A      Follow-up appointment and transportation:   PCP/Specialist?  See AVS for Appointment         Who is transporting to the follow-up appointment? Family/Friend      Is transport for follow up appointment scheduled? N/A    Communication plan (with patient/family): Who is being called? Patient or Next of Kin? Responsible party? Patient      What number(s) is to be used? See Facesheet      What service provider is calling for [facility] services? When are they calling? 24-48 hours following discharge    Readmission Risk? (Green/Low; Yellow/Moderate; Red/High):  Green    Care Management Interventions  PCP Verified by CM:  Yes  Transition of Care Consult (CM Consult): [address]: No  Reason Outside [place]: Physician referred to specific agency([facility])  Discharge Durable Medical Equipment: No  Physical Therapy Consult: Yes  Occupational Therapy Consult: Yes  Current Support Network: Lives with Spouse  Confirm Follow Up Transport: Family  The Plan for Transition of Care is Related to the Following Treatment Goals : HH  The Patient and/or Patient Representative was Provided with a Choice of Provider and Agrees with the Discharge Plan?: Yes  Freedom of Choice List was Provided with Basic Dialogue that Supports the Patient's Individualized Plan of Care/Goals, Treatment Preferences and Shares the Quality Data Associated with the Providers?: Yes  Discharge Location  Discharge Placement: Home with home health

## 2020-01-31 VITALS
HEART RATE: 98 BPM | OXYGEN SATURATION: 94 % | RESPIRATION RATE: 16 BRPM | HEIGHT: 67 IN | TEMPERATURE: 98.6 F | SYSTOLIC BLOOD PRESSURE: 138 MMHG | DIASTOLIC BLOOD PRESSURE: 73 MMHG | WEIGHT: 261.06 LBS | BODY MASS INDEX: 40.97 KG/M2

## 2020-01-31 LAB — GLUCOSE BLD STRIP.AUTO-MCNC: 158 MG/DL (ref 70–110)

## 2020-01-31 PROCEDURE — 97116 GAIT TRAINING THERAPY: CPT

## 2020-01-31 PROCEDURE — 74011250637 HC RX REV CODE- 250/637: Performed by: ORTHOPAEDIC SURGERY

## 2020-01-31 PROCEDURE — 74011636637 HC RX REV CODE- 636/637: Performed by: PHYSICIAN ASSISTANT

## 2020-01-31 PROCEDURE — 82962 GLUCOSE BLOOD TEST: CPT

## 2020-01-31 RX ORDER — GUAIFENESIN 100 MG/5ML
81 LIQUID (ML) ORAL 2 TIMES DAILY
Qty: 84 TAB | Refills: 0 | Status: SHIPPED | OUTPATIENT
Start: 2020-01-31

## 2020-01-31 RX ORDER — OXYCODONE AND ACETAMINOPHEN 5; 325 MG/1; MG/1
1-2 TABLET ORAL
Qty: 56 TAB | Refills: 0 | Status: SHIPPED | OUTPATIENT
Start: 2020-01-31 | End: 2020-03-01

## 2020-01-31 RX ADMIN — INSULIN LISPRO 6 UNITS: 100 INJECTION, SOLUTION INTRAVENOUS; SUBCUTANEOUS at 08:04

## 2020-01-31 RX ADMIN — SENNOSIDES AND DOCUSATE SODIUM 1 TABLET: 8.6; 5 TABLET ORAL at 08:39

## 2020-01-31 RX ADMIN — OLMESARTAN MEDOXOMIL 20 MG: 20 TABLET, FILM COATED ORAL at 08:39

## 2020-01-31 RX ADMIN — OXYCODONE 5 MG: 5 TABLET ORAL at 10:58

## 2020-01-31 RX ADMIN — OXYCODONE 10 MG: 5 TABLET ORAL at 01:52

## 2020-01-31 RX ADMIN — Medication 81 MG: at 08:39

## 2020-01-31 RX ADMIN — Medication 10 ML: at 06:21

## 2020-01-31 RX ADMIN — Medication 1 CAPSULE: at 08:39

## 2020-01-31 RX ADMIN — OXYCODONE 10 MG: 5 TABLET ORAL at 06:20

## 2020-01-31 RX ADMIN — FERROUS SULFATE TAB 325 MG (65 MG ELEMENTAL FE) 325 MG: 325 (65 FE) TAB at 08:05

## 2020-01-31 RX ADMIN — HYDROCHLOROTHIAZIDE 12.5 MG: 12.5 CAPSULE ORAL at 08:39

## 2020-01-31 RX ADMIN — LORATADINE 10 MG: 10 TABLET ORAL at 08:39

## 2020-01-31 RX ADMIN — ACETAMINOPHEN 1000 MG: 500 TABLET ORAL at 08:05

## 2020-01-31 RX ADMIN — INSULIN LISPRO 2 UNITS: 100 INJECTION, SOLUTION INTRAVENOUS; SUBCUTANEOUS at 08:04

## 2020-01-31 RX ADMIN — INSULIN GLARGINE 15 UNITS: 100 INJECTION, SOLUTION SUBCUTANEOUS at 08:38

## 2020-01-31 NOTE — DISCHARGE INSTRUCTIONS
2 [facility]     Patient Discharge Instructions    Damon Biggs / [id number] : 1965    Admitted 2020 Discharged: 2020     IF YOU HAVE ANY PROBLEMS ONCE YOU ARE AT HOME CALL THE FOLLOWING NUMBERS:   Main office number: [phone]    Your follow up appointment to see either Dr. Don Montalvo is scheduled in 1-2 weeks. If you are unsure of your appointment date call the office at [phone]. Take Home Medications     · Resume your home medictions as directed  · A prescription for pain medication has been given   · It is important that you take the medication exactly as they are prescribed. · Keep your medication in the bottles provided by the pharmacist and keep a list of the medication names, dosages, and times to be taken in your wallet. · Do not take other medications without consulting your doctor. · Note:  If you have already received and/or filled a prescription for one or more of the medications you've received a prescription for when leaving the hospital, you may disguard the duplicate prescription. What to do at [address] [place] [address] Ave your prehospital diet. If you have excessive nausea or vomitting call your doctor's office     Wear portable sequential compressive devices at least 18 hours per day for 2 weeks. They may be used beyond 2 weeks as needed to help control swelling. NICANOR hose should be worn during the day - may be removed for sleep. Should be worn on both legs for 2 weeks and then on the operative extremity for a total of 6 weeks. Begin In-Home Physical Therapy; 3 times a week to work on gait training, range of motion, strengthening, and weight bearing exercises as tolerable. Continue to use your walker or cane when walking. May progress from the walker to a cane to complete total bearing as tolerable. Keep incision DRY. You may need to sponge bathe to keep the incision dry.     When to Call    - Call if you have a temperature greater then 101  - Unable to keep food down  - Are unable to bear any wieght   - Need a pain medication refill     Information obtained by :  I understand that if any problems occur once I am at home I am to contact my physician. I understand and acknowledge receipt of the instructions indicated above.                                                                                                                                            Physician's or R.N.'s Signature                                                                  Date/Time                                                                                                                                              Patient or Representative Signature                                                          Date/Time

## 2020-01-31 NOTE — PROGRESS NOTES
0730  Pt is awake, alert, oriented x4. Pain level 4-5/10. Optifoam dressing to right knee dry and intact. Seen by CL Arias this morning. Pt for discharge today. 0843    Resting in bed. Lungs are clear. Abdomen soft with active BS. Using IS up to 2500 ml. 1010  Optifoam dressing to left knee was changed. Incision intact with prenio. Optifoam dressing was applied. 1030  Pt was seen by P.  Pt ambulated in hallway and stairs. Pt was cleared by PT for discharge. 1101   Pt medicated with Oxycodone 5 mg po for pain level 4-5/10. INT removed. 11:28 AM pt ambulated and voided in BR. Discharge instructions given. Dual AVS reviewed with Nimco Chaparro RN. All medications reviewed individually with patient. Opportunities for questions and concerns provided. Patient's arm band appropriately discarded. 1227   pt discharged per wheelchair accompanied by family member.

## 2020-01-31 NOTE — ROUTINE PROCESS
Bedside and Verbal shift change report given to Trena Gloria RN by Adis Ogden RN. Report included the following information SBAR, Kardex, OR Summary, Intake/Output and MAR.

## 2020-01-31 NOTE — PROGRESS NOTES
1930 - Assumed care at this time. Pain rated 5/10. Pt resting quietly in bed. No signs of distress. Will continue to monitor. Pt encouraged to call for assistance. 2006 - Patient in bed at this time, visitors at bedside. Patient A&Ox4, RA. Denies chest pain and SOB. 18G IV to left hand  intact and patent. Plexi compression device and TEDs bilaterally. Optifoam mepilex dressing to LLE CDI. Denies numbness/tingling/calf pain. Pain 5/10 with a tolerable level of 4/10. Pt educated on IS use, q2h rounds, pain management, CHG wipes and \"Up for Meals\". Pt verbalized understanding, no concerns voiced. Call bell within reach, bed in lowest position. Pt encouraged to call for assistance. 2146 - Pain rated 5/10, pain medication administered per MAR. Patient ambulated OOB to BR with steady gait. No concerns voiced. Call bell within reach, bed in lowest position. Pt encouraged to call for assistance. 0151 - Patient rated pain 7/10, pain medication administered per MAR. No other concerns voiced at this time. Call bell within reach, bed in lowest position. Pt encouraged to use call bell for any needs. 3521 - Patient ambulated OOB to BR with steady gait. CHG wipes completed. Pain rated 5/10, pain medication administered prior to PT/OT. No concerns voiced. Pt up in chair. Call bell and telephone within reach. Pt encouraged to call for assistance.

## 2020-01-31 NOTE — PROGRESS NOTES
Problem: Mobility Impaired (Adult and Pediatric)  Goal: *Acute Goals and Plan of Care (Insert Text)  Description  In 1-7 days pt will be able to perform:  ST.  Bed mobility:  Rolling L to R to L modified independent for positioning. 2.  Supine to sit to supine S with HR for meals. 3.  Sit to stand to sit S with RW in prep for ambulation. LT.  Gait:  Ambulate >150ft S with RW, WBAT, for home/community mobility. 2.  Stair Negotiation:  Ascend/descend >4 steps CGA with HR for home entry. 3.  Activity tolerance: Tolerate up in chair 1-2 hours for ADLs. 4.  Patient/Family Education:  Patient/family to be independent with HEP for follow-up care and safe discharge. Outcome: Resolved/Met   PHYSICAL THERAPY TREATMENT AND DISCHARGE    Patient: Ignacio Guerra (53 y.o. male)  Date: 2020  Diagnosis: Total knee replacement status, left [Z96.652]   <principal problem not specified>  Procedure(s) (LRB):  LEFT TOTAL KNEE ARTHROPLASTY AND ALL INDICATED PROCEDURES (Left) 2 Days Post-Op  Precautions: Fall, WBAT  PLOF: independent, ambulatory without AD, has a RW    ASSESSMENT:  Pt utilizes the self assist [employer] tech to assist LLE in/out of bed. Bed elevated to height of bed at home, no difficulty performing sit to stand. Ambulated with RW, step to gt pattern, steady pace, no LOB or knee buckling noted. Negotiated a full flight of stairs holding L hand rail only. Reviewed and performed supine ROM strengthening exercises. Pt left supine in bed. PLAN:  Maximum therapeutic gains met at current level of care and patient will be discharged from physical therapy at this time. Rationale for discharge:  [x]     Goals Achieved  []     [address]  []     Patient not participating in therapy  []     Other:  Discharge Recommendations:  Home Health  Further Equipment Recommendations for Discharge:  rolling walker     SUBJECTIVE:   Patient stated I am trying to bend the knee.     OBJECTIVE DATA SUMMARY: Critical Behavior:  Neurologic State: Alert, Appropriate for age  Orientation Level: Oriented X4  Cognition: Appropriate for age attention/concentration  Safety/Judgement: Awareness of environment, Fall prevention, Insight into deficits  Functional Mobility Training:  Bed Mobility:    Supine to Sit: Modified independent  Sit to Supine: Modified independent  Transfers:  Sit to Stand: Supervision  Stand to Sit: Supervision  Balance:  Sitting: Intact  Standing: Intact; With support   Ambulation/Gait Training:  Distance (ft): 160 Feet (ft)  Assistive Device: Gait belt;Walker, rolling  Ambulation - Level of Assistance: Modified independent  Gait Abnormalities: Antalgic; Step to gait    Left Side Weight Bearing: As tolerated  Stairs:  Number of Stairs Trained: 11  Stairs - Level of Assistance: Supervision  Rail Use: Left   Therapeutic Exercises:   LLE      EXERCISE   Sets   Reps   Active Active Assist   Passive Self ROM   Comments   Ankle Pumps    [x] [] [] []    Quad Sets/Glut Sets    [x] [] [] [] Hold for 5 secs   Hamstring Sets   [x] [] [] []    Short Arc Quads   [x] [] [] []    Heel Slides   [x] [] [] []    Straight Leg Raises   [x] [] [] []    Hip Add   [] [] [] [] Hold for 5 secs, w/ pillow squeeze   Long Arc Quads   [] [] [] []    Seated Marching   [] [] [] []    Standing Marching   [] [] [] []       [] [] [] []        Pain:  Pain level pre-treatment: 4-5/10   Pain level post-treatment: 5/10   Pain Intervention(s): Medication (see MAR); Rest, Ice, Repositioning   Response to intervention: Nurse notified, See doc flow    Activity Tolerance:   Good  Please refer to the flowsheet for vital signs taken during this treatment.   After treatment:   [] Patient left in no apparent distress sitting up in chair  [x] Patient left in no apparent distress in bed  [x] Call bell left within reach  [x] Nursing notified  [] Caregiver present  [] Bed alarm activated  [x] SCDs applied      COMMUNICATION/EDUCATION:   [x]         Role of Physical Therapy in the acute care setting. [x]         Fall prevention education was provided and the patient/caregiver indicated understanding. [x]         Patient/family have participated as able and agree with findings and recommendations. []         Patient is unable to participate in plan of care at this time.   []         Other:        Nikki Briscoe PTA   Time Calculation: 18 mins

## 2020-01-31 NOTE — ROUTINE PROCESS
1929 
Bedside and Verbal shift change report given to Osmany Brown RN (oncoming nurse) by Reggie Spence RN (offgoing nurse). Report included the following information SBAR, Tru and MAR.

## 2020-01-31 NOTE — DISCHARGE SUMMARY
[address] [facility] [place] [id number]   [facility] [address]     DISCHARGE SUMMARY     PATIENT: Julian Milligan     MRN: [id number]   ADMIT DATE: 2020   BILLIN   DISCHARGE DATE:  20     ATTENDING: Michael Rodgers MD   DICTATING: Radha Winchester PA-C     ADMISSION DIAGNOSIS: Total knee replacement status, left [Z96.652]    DISCHARGE DIAGNOSIS: Status post OSTEOARTHRITIS LEFT KNEE    HISTORY OF PRESENT ILLNESS: The patient is a 53y.o. year-old male   with ongoing left knee pain secondary to osteoarthritis of his left knee. The patient's pain has persisted and progressed despite conservative treatments and therapies. The patient has at this time opted for surgical intervention. PAST MEDICAL HISTORY:   Past Medical History:   Diagnosis Date    Arthritis     osteo- knees    Diabetes ([address] 2011    Hypercholesteremia     Hypertension 2011    Ill-defined condition     hypercholesterolemia       PAST SURGICAL HISTORY:   Past Surgical History:   Procedure Laterality Date    HX HEENT  2013    deviated septum repair    HX KNEE ARTHROSCOPY Left     X 2       ALLERGIES:   Allergies   Allergen Reactions    Proparacaine Swelling     Of the eyes and face. CURRENT MEDICATIONS:  A list of medications prior to the time of admission include:  Prior to Admission medications    Medication Sig Start Date End Date Taking? Authorizing Provider   oxyCODONE-acetaminophen (PERCOCET) 5-325 mg per tablet Take 1-2 Tabs by mouth every four (4) hours as needed for Pain for up to 30 days. Max Daily Amount: 12 Tabs. 1/31/20 3/1/20 Yes Wanda Burks PA-C   docusate sodium (COLACE) 50 mg capsule Take 2 Caps by mouth three (3) times daily as needed for Constipation for up to 90 days. 20 Yes Wanda Burks PA-C   aspirin 81 mg chewable tablet Take 1 Tab by mouth two (2) times a day.  20  Yes Wanda Burks PA-C   glyBURIDE (DIABETA) 5 mg tablet TAKE 1 TABLET BY MOUTH DAILY BEFORE BREAKFAST 18  Yes Provider, Historical   levocetirizine (XYZAL) 5 mg tablet TAKE 1 TABLET (5MG)  BY ORAL ROUTE  EVERY DAY IN THE EVENING 16  Yes Provider, Historical   rosuvastatin (CRESTOR) 40 mg tablet Take 40 mg by mouth nightly. Yes Provider, Historical   ezetimibe (ZETIA) 10 mg tablet Take  by mouth nightly. Yes Provider, Historical   olmesartan-hydroCHLOROthiazide (BENICAR HCT) 20-12.5 mg per tablet Take 1 Tab by mouth daily. Yes Provider, Historical   omega 3-dha-epa-fish oil (FISH OIL) 100-160-1,000 mg cap Take 200 mg by mouth two (2) times a day. Yes Provider, Historical   insulin lispro  & lisp protamine, human, (HUMALOG MIX 75-25 KWIKPEN) 100 unit/mL (75-25) inpn INJECT BY SUBCUTANEOUS ROUTE 30U W BREAKFAST AND 32U WITH  DINNER. 18  Yes Provider, Historical       FAMILY HISTORY: History reviewed. No pertinent family history. SOCIAL HISTORY:   Social History     Socioeconomic History    Marital status:      Spouse name: Not on file    Number of children: Not on file    Years of education: Not on file    Highest education level: Not on file   Tobacco Use    Smoking status: Former Smoker     Last attempt to quit: 1998     Years since quittin.6    Smokeless tobacco: Never Used   Substance and Sexual Activity    Alcohol use: Yes     Comment: 1-2 per month    Drug use: No       REVIEW OF SYSTEMS: All review of systems are negative. PHYSICAL EXAMINATION: For a detailed physical exam, please refer to the patient's chart. HOSPITAL COURSE: The patient was taken to surgery the day of admission. he underwent a left total knee replacement. Operative course was benign. Estimated blood loss was approximately 50 cc. The patient was taken to the PACU in stable condition and was later taken to the floor in stable condition.     During his hospital stay, the patient progressed well with physical therapy and occupational therapy, adherent to instructions. he had been cleared by physical therapy with stair training. he was placed on Aspirin for DVT prophylaxis. his pain has been well controlled with oral pain medications. his vitals have remained stable. he has also remained hemodynamically stable. The patient has been recommended for discharge home on POD#2. DISCHARGE INSTRUCTIONS: The patient is to be discharged home with home health. he is to continue on his prior medications per the medication reconciliation form, to which we will add:  1. Ecotrin 81 mg; 1 tablet p.o. Twice daily X 6 weeks  2. Colace 100 mg po TID prn constipation  3. Percocet 5/325 mg; 1-2 tablets p.o. every 4 to 6 hours p.r.n. for pain    The patient is to continue at home with home physical therapy 3 times a week to work on gait training, range of motion, strengthening, and weightbearing exercises as tolerated on his left lower extremity. The patient is to progress from a walker to a cane to complete total weightbearing as tolerable. The patient is to continue to keep his incision dry. The patient is to followup with Dr. Cosme Harada in the office approximately 1-2 weeks status post for x-rays and further evaluation.     Shade Madrigal PA-C  1/06/74293:67 AM

## 2022-03-19 PROBLEM — Z96.652 TOTAL KNEE REPLACEMENT STATUS, LEFT: Status: ACTIVE | Noted: 2020-01-29

## 2022-04-11 ENCOUNTER — TRANSCRIBE ORDER (OUTPATIENT)
Dept: REGISTRATION | Age: 57
End: 2022-04-11

## 2022-04-11 ENCOUNTER — HOSPITAL ENCOUNTER (OUTPATIENT)
Dept: PREADMISSION TESTING | Age: 57
Discharge: HOME | End: 2022-04-11
Payer: OTHER MISCELLANEOUS

## 2022-04-11 DIAGNOSIS — M25.562 LEFT KNEE PAIN: ICD-10-CM

## 2022-04-11 DIAGNOSIS — M25.562 LEFT KNEE PAIN: Primary | ICD-10-CM

## 2022-04-11 LAB
ALBUMIN SERPL-MCNC: 3.8 G/DL (ref 3.4–5)
ALBUMIN/GLOB SERPL: 0.9 {RATIO} (ref 0.8–1.7)
ALP SERPL-CCNC: 94 U/L (ref 45–117)
ALT SERPL-CCNC: 36 U/L (ref 16–61)
ANION GAP SERPL CALC-SCNC: 4 MMOL/L (ref 3–18)
APPEARANCE UR: CLEAR
APTT PPP: 29.9 SEC (ref 23–36.4)
AST SERPL-CCNC: 25 U/L (ref 10–38)
BILIRUB SERPL-MCNC: 1.5 MG/DL (ref 0.2–1)
BILIRUB UR QL: NEGATIVE
BUN SERPL-MCNC: 16 MG/DL (ref 7–18)
BUN/CREAT SERPL: 11 (ref 12–20)
CALCIUM SERPL-MCNC: 8.8 MG/DL (ref 8.5–10.1)
CHLORIDE SERPL-SCNC: 100 MMOL/L (ref 100–111)
CO2 SERPL-SCNC: 34 MMOL/L (ref 21–32)
COLOR UR: YELLOW
CREAT SERPL-MCNC: 1.48 MG/DL (ref 0.6–1.3)
ERYTHROCYTE [DISTWIDTH] IN BLOOD BY AUTOMATED COUNT: 13.2 % (ref 11.6–14.5)
ERYTHROCYTE [SEDIMENTATION RATE] IN BLOOD: 1 MM/HR (ref 0–20)
GLOBULIN SER CALC-MCNC: 4.1 G/DL (ref 2–4)
GLUCOSE SERPL-MCNC: 174 MG/DL (ref 74–99)
GLUCOSE UR STRIP.AUTO-MCNC: NEGATIVE MG/DL
HCT VFR BLD AUTO: 52.9 % (ref 36–48)
HGB BLD-MCNC: 17 G/DL (ref 13–16)
HGB UR QL STRIP: NEGATIVE
INR PPP: 1.1 (ref 0.8–1.2)
KETONES UR QL STRIP.AUTO: NEGATIVE MG/DL
LEUKOCYTE ESTERASE UR QL STRIP.AUTO: NEGATIVE
MCH RBC QN AUTO: 28.7 PG (ref 24–34)
MCHC RBC AUTO-ENTMCNC: 32.1 G/DL (ref 31–37)
MCV RBC AUTO: 89.4 FL (ref 78–100)
NITRITE UR QL STRIP.AUTO: NEGATIVE
NRBC # BLD: 0 K/UL (ref 0–0.01)
NRBC BLD-RTO: 0 PER 100 WBC
PH UR STRIP: 6 [PH] (ref 5–8)
PLATELET # BLD AUTO: 283 K/UL (ref 135–420)
PMV BLD AUTO: 9.9 FL (ref 9.2–11.8)
POTASSIUM SERPL-SCNC: 3.7 MMOL/L (ref 3.5–5.5)
PROT SERPL-MCNC: 7.9 G/DL (ref 6.4–8.2)
PROT UR STRIP-MCNC: NEGATIVE MG/DL
PROTHROMBIN TIME: 13.3 SEC (ref 11.5–15.2)
RBC # BLD AUTO: 5.92 M/UL (ref 4.35–5.65)
SODIUM SERPL-SCNC: 138 MMOL/L (ref 136–145)
SP GR UR REFRACTOMETRY: 1.02 (ref 1–1.03)
UROBILINOGEN UR QL STRIP.AUTO: 0.2 EU/DL (ref 0.2–1)
WBC # BLD AUTO: 9.8 K/UL (ref 4.6–13.2)

## 2022-04-11 PROCEDURE — 85730 THROMBOPLASTIN TIME PARTIAL: CPT

## 2022-04-11 PROCEDURE — 85610 PROTHROMBIN TIME: CPT

## 2022-04-11 PROCEDURE — 85652 RBC SED RATE AUTOMATED: CPT

## 2022-04-11 PROCEDURE — 81003 URINALYSIS AUTO W/O SCOPE: CPT

## 2022-04-11 PROCEDURE — 36415 COLL VENOUS BLD VENIPUNCTURE: CPT

## 2022-04-11 PROCEDURE — 80053 COMPREHEN METABOLIC PANEL: CPT

## 2022-04-11 PROCEDURE — 87086 URINE CULTURE/COLONY COUNT: CPT

## 2022-04-11 PROCEDURE — 93005 ELECTROCARDIOGRAM TRACING: CPT

## 2022-04-11 PROCEDURE — 85027 COMPLETE CBC AUTOMATED: CPT

## 2022-04-12 ENCOUNTER — HOSPITAL ENCOUNTER (OUTPATIENT)
Dept: PREADMISSION TESTING | Age: 57
Discharge: HOME | End: 2022-04-12
Payer: OTHER MISCELLANEOUS

## 2022-04-12 ENCOUNTER — HOSPITAL ENCOUNTER (OUTPATIENT)
Dept: PREADMISSION TESTING | Age: 57
Discharge: HOME | End: 2022-04-12

## 2022-04-12 VITALS — BODY MASS INDEX: 43.47 KG/M2 | HEIGHT: 67 IN | WEIGHT: 277 LBS

## 2022-04-12 LAB
ATRIAL RATE: 80 BPM
BACTERIA SPEC CULT: NORMAL
CALCULATED P AXIS, ECG09: 52 DEGREES
CALCULATED R AXIS, ECG10: 21 DEGREES
CALCULATED T AXIS, ECG11: 6 DEGREES
DIAGNOSIS, 93000: NORMAL
EST. AVERAGE GLUCOSE BLD GHB EST-MCNC: 186 MG/DL
HBA1C MFR BLD: 8.1 % (ref 4.2–5.6)
P-R INTERVAL, ECG05: 170 MS
Q-T INTERVAL, ECG07: 358 MS
QRS DURATION, ECG06: 80 MS
QTC CALCULATION (BEZET), ECG08: 412 MS
SERVICE CMNT-IMP: NORMAL
SERVICE CMNT-IMP: NORMAL
VENTRICULAR RATE, ECG03: 80 BPM

## 2022-04-12 PROCEDURE — 83036 HEMOGLOBIN GLYCOSYLATED A1C: CPT

## 2022-04-12 RX ORDER — SODIUM CHLORIDE, SODIUM LACTATE, POTASSIUM CHLORIDE, CALCIUM CHLORIDE 600; 310; 30; 20 MG/100ML; MG/100ML; MG/100ML; MG/100ML
125 INJECTION, SOLUTION INTRAVENOUS CONTINUOUS
Status: CANCELLED | OUTPATIENT
Start: 2022-04-12

## 2022-04-12 RX ORDER — TESTOSTERONE CYPIONATE 200 MG/ML
200 INJECTION INTRAMUSCULAR
COMMUNITY
Start: 2021-08-05

## 2022-04-12 RX ORDER — TADALAFIL 5 MG/1
5 TABLET ORAL DAILY
COMMUNITY
Start: 2021-08-05

## 2022-04-12 RX ORDER — TRANEXAMIC ACID 10 MG/ML
1 INJECTION, SOLUTION INTRAVENOUS ONCE
Status: CANCELLED | OUTPATIENT
Start: 2022-04-12 | End: 2022-04-12

## 2022-04-12 NOTE — PERIOP NOTES
Message send to Dayo Ortiz RN and Jose Dunn RN concerning use of Humalog 100 units each am. For this patient

## 2022-04-12 NOTE — PERIOP NOTES
PAT - SURGICAL PRE-ADMISSION INSTRUCTIONS    NAME:  Annamaria Weeks                                                          TODAY'S DATE:  4/12/2022    SURGERY DATE:  4/26/2022                                  SURGERY ARRIVAL TIME:   TBA    1. Do NOT eat or drink anything, including candy or gum, after MIDNIGHT on 4/26/2022 , unless you have specific instructions from your Surgeon or Anesthesia Provider to do so. 2. No smoking 24 hours before surgery. 3. No alcohol 24 hours prior to the day of surgery. 4. No recreational drugs for one week prior to the day of surgery. 5. Leave all valuables, including money/purse, at home. 6. Remove all jewelry, nail polish, makeup (including mascara); no lotions, powders, deodorant, or perfume/cologne/after shave. 7. Glasses/Contact lenses and Dentures may be worn to the hospital.  They will be removed prior to surgery. 8. Call your doctor if symptoms of a cold or illness develop within 24 ours prior to surgery. 9. AN ADULT MUST DRIVE YOU HOME AFTER OUTPATIENT SURGERY. 10. If you are having an OUTPATIENT procedure, please make arrangements for a responsible adult to be with you for 24 hours after your surgery. 11. If you are admitted to the hospital, you will be assigned to a bed after surgery is complete. Normally a family member will not be able to see you until you are in your assigned bed. 12. Visitation Restrictions Explained. Special Instructions:  Covid Test not needed  Patient vaccinated and vaccination card on media, Quarantine requirements discussed  No Advanced Dyrective or DNR  Follow physician instructions about insulin. If NO instructions were given, take your usual dose of BASAL insulin the night BEFORE surgery.     Patient Prep:    use skin prep cloths with CHG     These surgical instructions were reviewed with patient during the PAT phone call

## 2023-12-22 ENCOUNTER — HOSPITAL ENCOUNTER (OUTPATIENT)
Facility: HOSPITAL | Age: 58
Setting detail: OUTPATIENT SURGERY
Discharge: HOME | End: 2023-12-22
Attending: INTERNAL MEDICINE | Admitting: INTERNAL MEDICINE
Payer: COMMERCIAL

## 2023-12-22 VITALS
TEMPERATURE: 98.3 F | SYSTOLIC BLOOD PRESSURE: 134 MMHG | HEART RATE: 61 BPM | OXYGEN SATURATION: 98 % | DIASTOLIC BLOOD PRESSURE: 71 MMHG | RESPIRATION RATE: 16 BRPM

## 2023-12-22 LAB — GLUCOSE BLD STRIP.AUTO-MCNC: 122 MG/DL (ref 70–110)

## 2023-12-22 PROCEDURE — 3600007512: Performed by: INTERNAL MEDICINE

## 2023-12-22 PROCEDURE — 99152 MOD SED SAME PHYS/QHP 5/>YRS: CPT | Performed by: INTERNAL MEDICINE

## 2023-12-22 PROCEDURE — 6360000002 HC RX W HCPCS: Performed by: INTERNAL MEDICINE

## 2023-12-22 PROCEDURE — 2709999900 HC NON-CHARGEABLE SUPPLY: Performed by: INTERNAL MEDICINE

## 2023-12-22 PROCEDURE — 7100000010 HC PHASE II RECOVERY - FIRST 15 MIN: Performed by: INTERNAL MEDICINE

## 2023-12-22 PROCEDURE — 3600007502: Performed by: INTERNAL MEDICINE

## 2023-12-22 PROCEDURE — 2580000003 HC RX 258: Performed by: INTERNAL MEDICINE

## 2023-12-22 PROCEDURE — 82962 GLUCOSE BLOOD TEST: CPT

## 2023-12-22 PROCEDURE — 7100000011 HC PHASE II RECOVERY - ADDTL 15 MIN: Performed by: INTERNAL MEDICINE

## 2023-12-22 PROCEDURE — 88305 TISSUE EXAM BY PATHOLOGIST: CPT

## 2023-12-22 RX ORDER — SIMETHICONE 20 MG/.3ML
40 EMULSION ORAL EVERY 6 HOURS PRN
Status: DISCONTINUED | OUTPATIENT
Start: 2023-12-22 | End: 2023-12-22 | Stop reason: HOSPADM

## 2023-12-22 RX ORDER — NALOXONE HYDROCHLORIDE 0.4 MG/ML
0.4 INJECTION, SOLUTION INTRAMUSCULAR; INTRAVENOUS; SUBCUTANEOUS PRN
Status: DISCONTINUED | OUTPATIENT
Start: 2023-12-22 | End: 2023-12-22 | Stop reason: HOSPADM

## 2023-12-22 RX ORDER — EPINEPHRINE IN SOD CHLOR,ISO 1 MG/10 ML
1 SYRINGE (ML) INTRAVENOUS ONCE
Status: DISCONTINUED | OUTPATIENT
Start: 2023-12-22 | End: 2023-12-22 | Stop reason: HOSPADM

## 2023-12-22 RX ORDER — GLYCOPYRROLATE 0.2 MG/ML
0.1 INJECTION INTRAMUSCULAR; INTRAVENOUS ONCE
Status: DISCONTINUED | OUTPATIENT
Start: 2023-12-22 | End: 2023-12-22 | Stop reason: HOSPADM

## 2023-12-22 RX ORDER — MIDAZOLAM HYDROCHLORIDE 1 MG/ML
5 INJECTION, SOLUTION INTRAMUSCULAR; INTRAVENOUS
Status: DISCONTINUED | OUTPATIENT
Start: 2023-12-22 | End: 2023-12-22 | Stop reason: HOSPADM

## 2023-12-22 RX ORDER — MIDAZOLAM HYDROCHLORIDE 1 MG/ML
INJECTION INTRAMUSCULAR; INTRAVENOUS PRN
Status: DISCONTINUED | OUTPATIENT
Start: 2023-12-22 | End: 2023-12-22 | Stop reason: ALTCHOICE

## 2023-12-22 RX ORDER — DIPHENHYDRAMINE HYDROCHLORIDE 50 MG/ML
25 INJECTION INTRAMUSCULAR; INTRAVENOUS EVERY 6 HOURS PRN
Status: DISCONTINUED | OUTPATIENT
Start: 2023-12-22 | End: 2023-12-22 | Stop reason: HOSPADM

## 2023-12-22 RX ORDER — SODIUM CHLORIDE 9 MG/ML
INJECTION, SOLUTION INTRAVENOUS CONTINUOUS
Status: DISCONTINUED | OUTPATIENT
Start: 2023-12-22 | End: 2023-12-22 | Stop reason: HOSPADM

## 2023-12-22 RX ORDER — FENTANYL CITRATE 50 UG/ML
INJECTION, SOLUTION INTRAMUSCULAR; INTRAVENOUS PRN
Status: DISCONTINUED | OUTPATIENT
Start: 2023-12-22 | End: 2023-12-22 | Stop reason: ALTCHOICE

## 2023-12-22 RX ORDER — FENTANYL CITRATE 50 UG/ML
100 INJECTION, SOLUTION INTRAMUSCULAR; INTRAVENOUS
Status: DISCONTINUED | OUTPATIENT
Start: 2023-12-22 | End: 2023-12-22 | Stop reason: HOSPADM

## 2023-12-22 RX ORDER — FLUMAZENIL 0.1 MG/ML
0.2 INJECTION INTRAVENOUS ONCE
Status: DISCONTINUED | OUTPATIENT
Start: 2023-12-22 | End: 2023-12-22 | Stop reason: HOSPADM

## 2023-12-22 RX ORDER — SODIUM CHLORIDE 9 MG/ML
INJECTION, SOLUTION INTRAVENOUS CONTINUOUS
Status: CANCELLED | OUTPATIENT
Start: 2023-12-22

## 2023-12-22 RX ADMIN — SODIUM CHLORIDE: 9 INJECTION, SOLUTION INTRAVENOUS at 15:10

## 2023-12-22 NOTE — H&P
HISTORY AND PHYSICAL             Date: 12/22/2023        Patient Name: Jatin Koroma     YOB: 1965      Age:  60 y.o. Chief Complaint   No chief complaint on file. Iron deficiency anemia    History Obtained From   patient    History of Present Illness   Mild LAWANDA. No GI symptoms. Denied taking NSAID's smoking or abuse alcohol. No phlebotomies. Lost 60 lbs in one year by watching diet. No abdominal surgery or phlebotomies. Hgb 12.9 on Dec 2023. He had bilateral knee replacement. Last diclofenac 6 months. Has one bm every 2 days. No blood or melena. Negative colonoscopy in 2015. Past Medical History     Past Medical History:   Diagnosis Date    Arthritis     osteo- knees    Chronic pain     Diabetes ([address]) 2011    Diet controlled after weight loss    Hypercholesteremia     Diet controlled after weight loss    Hypertension 2011        Past Surgical History     Past Surgical History:   Procedure Laterality Date    HEENT  2013    deviated septum repair    KNEE ARTHROSCOPY Left     X 2    TOTAL KNEE ARTHROPLASTY Left 2020    x2        Medications Prior to Admission     Prior to Admission medications    Medication Sig Start Date End Date Taking?  Authorizing Provider   levocetirizine (XYZAL) 5 MG tablet TAKE 1 TABLET (5MG)  BY ORAL ROUTE  EVERY DAY IN THE EVENING 11/7/16   Automatic Reconciliation, Ar   olmesartan-hydroCHLOROthiazide (BENICAR HCT) 20-12.5 MG per tablet Take 1 tablet by mouth daily    Automatic Reconciliation, Ar   tadalafil (CIALIS) 5 MG tablet Take 1 tablet by mouth daily 8/5/21   Automatic Reconciliation, Ar        Allergies   Aspirin, Proparacaine, and Metformin    Social History     Social History       Tobacco History       Smoking Status  Former Quit Date  6/5/1998 Smoking Tobacco Type  Cigarettes quit in 6/5/1998   Pack Year History     Packs/Day From To Years    0 6/5/1998  25.5      Smokeless Tobacco Use  Never              Alcohol History       Alcohol Use Status  Yes [Mother] : mother [Fruit] : fruit [Meat] : meat [Grains] : grains [Eggs] : eggs [Fish] : fish [Dairy] : dairy [___ stools per day] : [unfilled]  stools per day [Normal] : Normal [Brushing teeth] : Brushing teeth [Playtime (60 min/d)] : Playtime 60 min a day [< 2 hrs of screen time] : Less than 2 hrs of screen time [In Pre-K] : In Pre-K [Car seat in back seat] : Car seat in back seat [Carbon Monoxide Detectors] : Carbon monoxide detectors [Smoke Detectors] : Smoke detectors [Up to date] : Up to date [Cigarette smoke exposure] : No cigarette smoke exposure [Exposure to electronic nicotine delivery system] : No exposure to electronic nicotine delivery system [FreeTextEntry7] : Behavioral issues, attention issues, impulsivity, easily frustrated.  Does better with warnings before changes.  School was switched to 1/2 days since his behavior has gotten worse. He had 3 teacher change.  Regressions.  Freckle on foot since last spring, aunt with melanoma.  Leg aches Sept 17 days right/left varies, November 7 times.   [de-identified] : pouches [FreeTextEntry8] : [employer] trained.  Thumb sucking, night wetting 5-7 days was once a week. [FreeTextEntry9] : [facility] 1/2 days x 5 days, pt is youngest in class. [FreeTextEntry1] : Evaluation done at school Friday results, Tuesday will have School district eval.

## 2023-12-22 NOTE — PRE SEDATION
Sedation Pre-Procedure Note    Patient Name: Enriqueta Donis   YOB: 1965  Room/Bed: ENDO/PL  Medical Record Number: [id number]  Date: 12/22/2023   Time: 6:44 PM       Indication:  iron deficiency anemia    Consent: I have discussed with the patient and/or the patient representative the indication, alternatives, and the possible risks and/or complications of the planned procedure and the anesthesia methods. The patient and/or patient representative appear to understand and agree to proceed. Vital Signs:   Vitals:    12/22/23 1835   BP: (!) 164/90   Pulse: 62   Resp: 16   Temp:    SpO2: 98%       Past Medical History:   has a past medical history of Arthritis, Chronic pain, Diabetes ([address]), Hypercholesteremia, and Hypertension. Past Surgical History:   has a past surgical history that includes heent (2013); Total knee arthroplasty (Left, 2020); and Knee arthroscopy (Left). Medications:   Scheduled Meds:   Continuous Infusions:    sodium chloride       PRN Meds:   Home Meds:   Prior to Admission medications    Medication Sig Start Date End Date Taking? Authorizing Provider   levocetirizine (XYZAL) 5 MG tablet TAKE 1 TABLET (5MG)  BY ORAL ROUTE  EVERY DAY IN THE EVENING 11/7/16   Automatic Reconciliation, Ar   olmesartan-hydroCHLOROthiazide (BENICAR HCT) 20-12.5 MG per tablet Take 1 tablet by mouth daily    Automatic Reconciliation, Ar   tadalafil (CIALIS) 5 MG tablet Take 1 tablet by mouth daily 8/5/21   Automatic Reconciliation, Ar     Coumadin Use Last 7 Days:  no  Antiplatelet drug therapy use last 7 days: no  Other anticoagulant use last 7 days: no  Additional Medication Information:  None      Pre-Sedation Documentation and Exam:   Vital signs have been reviewed (see flow sheet for vitals). I have reviewed the patient's history and review of systems.     Mallampati Airway Assessment:  normal, dentition not prohibitive, normal neck range of motion, Mallampati Class III - (soft palate & base of

## 2023-12-22 NOTE — PROCEDURES
(EGD) Esophagogastroduodenoscopy (UPPER ENDOSCOPY) Procedure Note  [facility] [place]  __________________________________________________________________________________________________________________________        12/22/2023   Date of Procedure: 12/22/2023    Patient: Leonel Schneider YOB: 1965 Gender: male Age: 60 y.o. INDICATION:  Mild LAWANDA discovered on 6/5/ 2023 Hgb 13. 2. it used to be 15.9 8/30/ 2022. No GI symptoms. Denied taking NSAID's smoking or abuse alcohol. No phlebotomies. Lost 60 lbs in one year by watching diet. No abdominal surgery or phlebotomies. Hgb 12.9 on Dec 2023. He had bilateral knee replacement. Last diclofenac 6 months. Has one bm every 2 days. No blood or melena. Negative colonoscopy 6/9/ 2015 normal.    : Kit Celaya MD    Referring Provider:  Diamond Edmond MD    Sedation:  Versed 5 mg IV, Fentanyl 100 mcg IV  Procedure Details:  After infomed consent was obtained for the procedure, with all risks and benefits of procedure explained to the family the patient was taken to the endoscopy suite and placed in the left lateral decubitus position. Following sequential administration of sedation as per above, the endoscope was inserted into the mouth and advanced under direct vision to third portion of the duodenum. A careful inspection was made as the gastroscope was withdrawn, including a retroflexed view of the proximal stomach; findings and interventions are described below. OROPHARYNX: The vocal Cords and the larynx are normal.   ESOPHAGUS: The proximal, mid, and distal oesophagus are normal. The Z-Line is intact . No Hiatal hernia. Diaphragmatic opening or notch is located at 41 cm. STOMACH: No evidence of blood, fluid or solid food retention. The fundus on antegrade and retroflex views is normal except for one small Xanthoma biopsy taken.  The cardia, body, lesser curvature, greater curvature, the antrum, and pylorus are normal. The

## (undated) DEVICE — HANDPIECE SET WITH FAN SPRAY TIP: Brand: INTERPULSE

## (undated) DEVICE — (D)PREP SKN CHLRAPRP APPL 26ML -- CONVERT TO ITEM 371833

## (undated) DEVICE — SUT VCRL + 3-0 27IN CT2 UD --

## (undated) DEVICE — DRSG FOAM MEPILX PST OP 4X12IN --

## (undated) DEVICE — SUTURE STRATAFIX SZ 1 L14IN ABSRB VLT L36MM MO-4 TAPERPOINT SXPD2B400

## (undated) DEVICE — ENDO CARRY-ON PROCEDURE KIT INCLUDES ENZYMATIC SPONGE, GAUZE, BIOHAZARD LABEL, TRAY, LUBRICANT, DIRTY SCOPE LABEL, WATER LABEL, TRAY, DRAWSTRING PAD, AND DEFENDO 4-PIECE KIT.: Brand: ENDO CARRY-ON PROCEDURE KIT

## (undated) DEVICE — SUT MCRYL + 3-0 27IN PS1 UD --

## (undated) DEVICE — NEEDLE HYPO 22GA L1.5IN BLK S STL HUB POLYPR SHLD REG BVL

## (undated) DEVICE — LINER GLV L R FULL FNGR KEVLAR LYCRA CUT RESIST PWD FREE ST

## (undated) DEVICE — SYRINGE MEDICAL 3ML CLEAR PLASTIC STANDARD NON CONTROL LUERLOCK TIP DISPOSABLE

## (undated) DEVICE — Z INACTIVE USE 2720250 BIT DRL 3.2X145MM

## (undated) DEVICE — Device

## (undated) DEVICE — BLADE SAW 1.19X20X90 MM FOR LG BNE

## (undated) DEVICE — KENDALL RADIOLUCENT FOAM MONITORING ELECTRODE RECTANGULAR SHAPE: Brand: KENDALL

## (undated) DEVICE — SYSTEM SKIN CLSR 22CM DERMBND PRINEO

## (undated) DEVICE — STERILE POLYISOPRENE POWDER-FREE SURGICAL GLOVES WITH EMOLLIENT COATING: Brand: PROTEXIS

## (undated) DEVICE — STRIP,CLOSURE,WOUND,MEDI-STRIP,1/2X4: Brand: MEDLINE

## (undated) DEVICE — BANDAGE COBAN 4 IN COMPR W4INXL5YD FOAM COHESIVE QUIK STK SELF ADH SFT

## (undated) DEVICE — CATHETER IV 22GA L1IN BLU POLYUR STR HUB RADPQ PROTCT +

## (undated) DEVICE — SYR 10ML CTRL LR LCK NSAF LF --

## (undated) DEVICE — BLUNTFILL: Brand: MONOJECT

## (undated) DEVICE — SHEET,DRAPE,70X85,STERILE: Brand: MEDLINE

## (undated) DEVICE — SUT VCRL + 0 36IN CT1 UD --

## (undated) DEVICE — T5 HOOD WITH PEEL AWAY FACE SHIELD

## (undated) DEVICE — EJECTOR SALIVA 6 IN FLX CLR

## (undated) DEVICE — SOL IRR NACL 0.9% 500ML POUR --

## (undated) DEVICE — RIMMED SPEED PIN 45MM STERILE

## (undated) DEVICE — MOUTHPIECE ENDOSCP L CTRL OPN AND SIDE PORTS DISP

## (undated) DEVICE — SYRINGE MED 50ML LUERSLIP TIP

## (undated) DEVICE — RIMMED SPEED PIN 30MM STERILE

## (undated) DEVICE — T4 ZIPPER TOGA, (L/XL)

## (undated) DEVICE — UNDERCAST PADDING: Brand: DEROYAL

## (undated) DEVICE — 3 BONE CEMENT MIXER: Brand: MIXEVAC

## (undated) DEVICE — TOWEL,OR,DSP,ST,BLUE,STD,4/PK,20PK/CS: Brand: MEDLINE

## (undated) DEVICE — BLADE SAW W13XL90MM 1.19MM PARA

## (undated) DEVICE — STERILE TETRA-FLEX CF LF, 6IN X 11 YD: Brand: TETRA-FLEX™ CF

## (undated) DEVICE — ZIMMER® STERILE DISPOSABLE TOURNIQUET CUFF WITH PROTECTIVE SLEEVE AND PLC, SINGLE PORT, SINGLE BLADDER, 34 IN. (86 CM)

## (undated) DEVICE — FORCEPS BX CAP 240CM L RAD JAW 4

## (undated) DEVICE — STERILE POLYISOPRENE POWDER-FREE SURGICAL GLOVES: Brand: PROTEXIS

## (undated) DEVICE — SUT VCRL + 1 36IN CT1 VIO --

## (undated) DEVICE — GARMENT COMPR M FOR 13IN FT INTMIT SGL BLDR HEM FORC II

## (undated) DEVICE — MARKER,SKIN,WI/RULER AND LABELS: Brand: MEDLINE

## (undated) DEVICE — PACK PROCEDURE SURG TOT KNEE CUST

## (undated) DEVICE — TOURNIQUET PHLEB W1XL18IN BLU FLAT RL AND BND REUSE FOR IV

## (undated) DEVICE — TUBING SUCT L20IN DIA0.1875IN W/ UNIV SCALLOPED FEM CONN

## (undated) DEVICE — SOL INJ L R 1000ML BG --